# Patient Record
Sex: FEMALE | Race: OTHER | Employment: OTHER | URBAN - METROPOLITAN AREA
[De-identification: names, ages, dates, MRNs, and addresses within clinical notes are randomized per-mention and may not be internally consistent; named-entity substitution may affect disease eponyms.]

---

## 2019-08-27 ENCOUNTER — OFFICE VISIT (OUTPATIENT)
Dept: FAMILY MEDICINE CLINIC | Age: 64
End: 2019-08-27

## 2019-08-27 VITALS
RESPIRATION RATE: 16 BRPM | SYSTOLIC BLOOD PRESSURE: 110 MMHG | BODY MASS INDEX: 24.81 KG/M2 | TEMPERATURE: 97.1 F | DIASTOLIC BLOOD PRESSURE: 68 MMHG | WEIGHT: 115 LBS | HEART RATE: 66 BPM | HEIGHT: 57 IN | OXYGEN SATURATION: 97 %

## 2019-08-27 DIAGNOSIS — B96.81 HELICOBACTER PYLORI GASTRITIS: ICD-10-CM

## 2019-08-27 DIAGNOSIS — B19.20 HEPATITIS C VIRUS INFECTION WITHOUT HEPATIC COMA, UNSPECIFIED CHRONICITY: Primary | ICD-10-CM

## 2019-08-27 DIAGNOSIS — K29.70 HELICOBACTER PYLORI GASTRITIS: ICD-10-CM

## 2019-08-27 DIAGNOSIS — K64.4 EXTERNAL HEMORRHOIDS: ICD-10-CM

## 2019-08-27 DIAGNOSIS — J31.0 RHINITIS, UNSPECIFIED TYPE: ICD-10-CM

## 2019-08-27 RX ORDER — FLUTICASONE PROPIONATE 50 MCG
2 SPRAY, SUSPENSION (ML) NASAL DAILY
Qty: 1 BOTTLE | Refills: 12 | Status: SHIPPED | OUTPATIENT
Start: 2019-08-27 | End: 2021-03-15

## 2019-08-27 NOTE — PATIENT INSTRUCTIONS
Hepatitis C: Instrucciones de cuidado - [ Hepatitis C: Care Instructions ]  Instrucciones de cuidado    La hepatitis C es betty infección del hígado causada por un virus. Nicholas se contagia cuando la suze o los líquidos corporales de betty persona infectada entran en el organismo de Ramesh. Ursa ocurre con mayor frecuencia cuando se comparten agujas contaminadas con el virus. En el pasado, la gente se contagiaba con nicholas virus por transfusiones de Natan y trasplantes de [place]. Shun desde 1992, toda la suze y los órganos donados se analizan para determinar si tienen hepatitis C. Así que contagiarse con nicholas virus de esta manera es algo muy raro. Con menos frecuencia, la hepatitis C se puede contagiar a través de [employer] sexuales y por compartir artículos elizabet cuchillas de afeitar o cepillos de dientes. Las agujas para tatuajes y los \"piercings\" corporales también pueden transmitirel virus. El virus no siempre causa síntomas. Shun usted podría sentirse cansado. Y podría tener dolor de winter, dolor de músculos, náuseas y dolor en la parte superior derecha del abdomen. Otros síntomas incluyen piel amarillenta y [place]. El tratamiento en el hogar puede ayudarle a [employer] síntomas. Y rich médico podría recetarle medicamentos antivirales. La infección a ericka plazo puede conducir a daños graves en el hígado. Por lo tanto, asegúrese de acudir a todas las citas de Yvette Chowdhury. La atención de seguimiento es betty parte clave de rich tratamiento y seguridad. Asegúrese de hacer y acudir a todas las citas, y llame a rich médico si está teniendo problemas. También es betty buena idea saber los resultados de rayray exámenes y mantener betty lista de los medicamentos que negrita. ¿Cómo puede cuidarse en el hogar? · Sea trinidad con los medicamentos. Si rich médico le receta un medicamento antiviral, tómelo exactamente según las indicaciones. Llame a rich médico si carolyn estar teniendo problemas con rich medicamento.   · No sonja alcohol. El alcohol puede dañar el hígado. Avísele a rich médico si necesita ayuda para dejarlo. La asesoría psicológica, los grupos de apoyo y a veces algunos medicamentos pueden ayudarle a mantenerse sobrio. · No consuma drogas ilegales y no use medicamentos herbarios. Pueden empeorar los problemas del hígado. · Asegúrese de que rich médico sepa todos los medicamentos que negrita. Algunos medicamentos, elizabet el acetaminofén (Tylenol), pueden Boeing problemas del hígado. No tome ningún medicamento nuevo a menos que rich médico se lo indique. [place] incluye medicamentos de [employer] First American. · Mantenga un estilo de rojelio saludable. Whitney abundante ejercicio si se siente capaz de hacerlo. Siga betty dieta saludable. · Raquel abundantes líquidos, los suficientes para que rich orina sea de color amarillo suki o transparente elizabet el agua. Si tiene [employer], el corazón o el hígado y tiene que Tierra's líquidos, hable con rich médico antes de aumentar rich consumo. · Si no lo ha hecho todavía, vacúnese para protegerse de la hepatitis A y B, la gripe y el neumococo.  · La infección puede causarle comezón. Westland Snuffer y evite el sol. Trate de usar ropa de algodón. Hable con rich médico acerca de medicamentos de venta maverick para el picor. Estos incluyen difenhidramina (Benadryl) y clorfeniramina (Chlor-[place]). Siga las instrucciones de la etiqueta. · Si se siente deprimido, hable con rich médico acerca del tratamiento. Muchas personas se deprimen cuando tienen enfermedades crónicas. Tenga en cuenta que un medicamento antiviral puede empeorar la depresión. Para evitar contagiar la hepatitis C a otras personas  · Avísele a las personas con las que vive o tiene relaciones sexuales acerca de rich enfermedad lo antes que pueda. · No comparta agujas para inyectarse drogas. No comparta otros objetos (elizabet algodón, cucharas y [place]) con otros.  Averigüe si hay un programa de intercambio de agujas en rich localidad y utilícelo. Inscríbase en un programa para el tratamiento de la drogadicción. · Tenga relaciones sexuales más seguras. Reduzca la cantidad de parejas sexuales si tiene más de [place]. A menos que esté en betty relación de larga duración en la que ninguno de los dos tenga relaciones sexuales con Emmie, use siempre condones de látex cuando tenga relaciones sexuales. · No done suze ni productos de [place], [place], semen ni óvulos. · Asegúrese de que todo el equipo esté esterilizado si se va a hacer un tatuaje, un \"piercing\" corporal o si va a recibir acupuntura. · No comparta artículos personales. Estos incluyen cuchillas de afeitar, cepillos dentales, toallas y roni de uñas. · Avise de rich enfermedad a rich médico, dentista o cualquier otra persona que pudiera estar en contacto con rich suze. · Evite que otras personas entren en contacto con rich suze y otros líquidos corporales. Mantenga cubiertos cualquier Kelly perez. · [employer] marixa a fondo con [facility] y [place], [place] elizabet cualquier objeto que haya estado en contacto con rich suze. ¿Cuándo debe pedir ayuda? Llame al 911 en cualquier momento que considere que necesita atención de [place]. Por ejemplo, llame si:    · Se desmayó (perdió el conocimiento).     · Tiene graves dificultades para respirar.     · Se siente muy confuso y no puede pensar con claridad.     · Osman Simmer o algo parecido a granos de café molido.     · Glory heces son de color rojizo o muy sanguinolentas (con suze).    Llame a rich médico ahora mismo o busque atención médica inmediata si:    · Tiene cualquier dificultad para respirar.     · Tiene nuevos moretones o manchas de suze debajo de la piel.     · Glory heces son negruzcas y parecidas al alquitrán o tienen rastros de [place].     · Tiene señales de necesitar más líquidos.  Tiene los ojos hundidos y la boca seca, y [facility] solo poca cantidad de color oscuro.    Preste especial atención a los cambios en rich kofi y asegúrese de comunicarse con rich médico si:    · Le sangra la Uriel Brody.     · Le sangran las encías cuando se cepilla los dientes. ¿Dónde puede encontrar más información en inglés? Eugene Lee a http://ramya-nenita.info/. Eryn Riggs [id number] en la búsqueda para aprender más acerca de \"Hepatitis C: Instrucciones de cuidado - [ Hepatitis C: Care Instructions ]. \"  Revisado: 30 julio, 2018  Versión del contenido: 12.1  © 4752-8296 Healthwise, Incorporated. Las instrucciones de cuidado fueron adaptadas bajo licencia por [employer] Connections (which disclaims liability or warranty for this information). Si usted tiene [place] afección médica o sobre estas instrucciones, siempre pregunte a rich profesional de kofi. Healthwise, Incorporated niega toda garantía o responsabilidad por rich uso de esta información. Aprenda sobre la hepatitis C - [ Learning About Hepatitis C ]  ¿Qué es la hepatitis C? La hepatitis C es betty infección del hígado. Es causada por el virus de la hepatitis C. El virus se transmite a través de la suze y de los líquidos corporales infectados. La hepatitis C a menudo se transmite cuando betty persona comparte agujas infectadas utilizadas para inyectarse drogas ilegales. También se puede transmitir cuando betty persona utiliza betty aguja con suze infectada. [place] podría ocurrir al [employer] un tatuaje o \"piercing\". O podría suceder al ponerse betty inyección en algunos países en vías de desarrollo en donde se utilizan agujas más de betty vez para qasim inyecciones. En casos raros, betty madre con hepatitis C puede contagiar el virus a rich bebé en el momento del nacimiento. O un trabajador de atención médica puede exponerse accidentalmente a suze infectada con hepatitis C. Hay un riesgo muy pequeño de contraer el virus por contacto sexual. El riesgo es mayor si rich bonny sexual también tiene VIH u otra infección de transmisión sexual, o si tiene usted muchas parejas sexuales.   Usted no puede infectarse de la hepatitis C debido a un contacto casual. [place] es un contacto elizabet abrazarse, besarse, estornudar, toser y compartir alimentos o bebidas. ¿Qué sucede cuando usted tiene hepatitis C? Algunas personas que contraen hepatitis C la tienen kristal un tiempo corto y Marks Amen. [place] se llama hepatitis C aguda. Shun la mayoría de las personas tienen hepatitis C crónica. [place] puede conducir a daño hepático, así elizabet a cirrosis, cáncer de hígado e insuficiencia hepática. Los expertos recomiendan que determinados grupos de personas se zia la prueba para el virus. [place] incluye a personas que tienen señales de enfermedad del hígado o que alguna vez pierce compartido agujas al usar drogas ilegales. Pregúntele a rich médico si hacerse la prueba es adecuado para usted. También puede comprar betty prueba para hacerse en casa llamada \"Home Access Hepatitis C Check kit\" disponible en la mayoría de las farmacias. Si la prueba indica que usted ha estado expuesto al virus en el pasado, asegúrese de hablar con rich médico para averiguar si tiene el virus actualmente. ¿Cuáles son los síntomas? [address] con hepatitis C no tienen síntomas al principio. Los síntomas podrían incluir:  · Cansancio. · Dolor de winter. · Músculos adoloridos. · Náuseas. · Dolor en la parte superior derecha del abdomen. · Coloración amarillenta de la piel y de los ojos (ictericia). · Lurdes Hoover. ¿Cómo se puede prevenir la hepatitis C? No hay vacuna para prevenir la enfermedad. Cualquier persona que tenga hepatitis C puede transmitir el virus a otra persona. Usted puede william medidas para reducir las probabilidades de infección. · No comparta agujas para inyectarse drogas. · [address] en un entorno de atención de [place]. Use guantes y ropa de protección. Deseche adecuadamente agujas y otros objetos afilados.   · Asegúrese de que todos los instrumentos y los suministros estén esterilizados si se va a hacer un tatuaje, un \"piercing\" o si le van a hacer acupuntura. Para evitar contagiar la hepatitis C, si la tiene:  · No comparta agujas ni otros enseres, elizabet algodón, cucharas y [place], si [place] agujas para inyectarse drogas. · Mantenga cubiertos los arellano, los raspones y las [place]. [place] prevendrá que otras personas entren en contacto con rich suze y otros líquidos corporales. Deseche cualquier artículo empapado de suze, elizabet, por ejemplo, vendas usadas. · No done suze ni semen. · Vince y todo lo que haya estado en contacto con rich suze. Use jabón y [place]. · No comparta rich cepillo de dientes, navajas de afeitar, cortaúñas ni nada que pueda [employer] Inc. · Use condones de látex al [employer] sexuales si tiene VIH, más de [facility] bonny sexual o betty infección de transmisión sexual.  ¿Cómo se trata la hepatitis C?  · Si tiene hepatitis C aguda, rich médico probablemente le recete un medicamento. · Si tiene hepatitis C crónica, el tratamiento depende de si tiene daño hepático, otros problemas de kofi y la cantidad que tenga de virus en rich organismo y de qué tipo sea. · Usted tendrá que yennifer a rich médico con regularidad para [employer] de suze a fin de revisarse el hígado. La atención de seguimiento es betty parte clave de rich tratamiento y seguridad. Asegúrese de hacer y acudir a todas las citas, y llame a rich médico si está teniendo problemas. También es betty buena idea saber los resultados de rayray exámenes y mantener betty lista de los medicamentos que negrita. ¿Dónde puede encontrar más información en inglés? Jeremy Barajas a http://ramya-nenita.info/. Escriba C666 en la búsqueda para aprender más acerca de \"Aprenda sobre la hepatitis C - [ Learning About Hepatitis C ]. \"  Revisado: 30 julio, 2018  Versión del contenido: 12.1  © 0234-3805 Healthwise, Incorporated.  Las instrucciones de cuidado fueron adaptadas bajo licencia por Good Help Connections (which disclaims liability or warranty for this information). Si usted tiene [place] afección médica o sobre estas instrucciones, siempre pregunte a rich profesional de kofi. [facility], Incorporated niega toda garantía o responsabilidad por rich uso de esta información. Hemorroides: Instrucciones de cuidado - [ Hemorrhoids: Care Instructions ]  Instrucciones de [address] hemorroides son Deitra Pick agrandadas que se desarrollan en el canal del ano. Los síntomas más comunes son sangrado kristal las evacuaciones del intestino, comezón, hinchazón y dolor rectal. A veces pueden ser incómodas, miguel ángel las hemorroides carol vez son un problema grave. [address] hemorroides se pueden tratar con cambios sencillos en rich Marrianne Brice y rayray hábitos intestinales. Entre estos cambios se encuentran consumir más [place] y no esforzarse (pujar) para eliminar las heces (defecar). [address] hemorroides no requieren de [place] u otro tratamiento a menos que teetee muy grandes y dolorosas o sangren mucho. La atención de seguimiento es betty parte clave de rich tratamiento y seguridad. Asegúrese de hacer y acudir a todas las citas, y llame a rich médico si está teniendo problemas. También es betty buena idea saber los resultados de rayray exámenes y mantener betty lista de los medicamentos que negrita. ¿Cómo puede cuidarse en el hogar? · Siéntese en unas pocas pulgadas de agua tibia (baño de asiento) 3 veces al día y después de evacuar el intestino. El agua tibia ayuda con el dolor y la comezón. · Colóquese hielo en la gwen anal varias veces al día kristal 10 minutos cada vez. Póngase un paño wylie entre el hielo y la piel. Enseguida póngase betty toalla húmeda tibia en la gwen kristal otros 10 a 20 minutos. · [employer] analgésicos (medicamentos para el dolor) exactamente según las indicaciones. ? Si el médico le recetó un analgésico, tómelo según las indicaciones.   ? Si no está tomando un analgésico recetado, pregúntele a rich médico si puede william cata de [employer] First American. · Mantenga limpia la gwen del ano, miguel ángel límpiese con suavidad. Utilice agua y [address] [facility], [facility], o use toallitas para bebé o R Vitaly [place] 1 medicinales, elizabet las de [place]. · Utilice ropa interior de algodón y ropa holgada para reducir la humedad en la gwen del ano. · [employer]. Rebecca Cardozo elizabet panes y cereales integrales, vegetales crudos, frutas crudas y secas, y frijoles (habichuelas). · Raquel abundantes líquidos, suficientes para que rich orina sea de color amarillo suki o transparente elizabet el agua. Si tiene [place] [facility] Financial, el corazón o el hígado y tiene que Tierra's líquidos, hable con rich médico antes de aumentar rich consumo. · Utilice un ablandador de heces que contenga salvado o psilio. Puede ahorrar dinero comprando salvado o psilio (disponible a granel en la mayoría de las tiendas naturistas) y [employer] comidas o mezclándolo con jugo de fruta. O puede utilizar productos elizabet Metamucil o Hydrocil. · Practique hábitos saludables de evacuación. ? Vaya al baño tan pronto elizabet tenga ganas. ? Evite esforzarse al evacuar. Relájese y dese tiempo para dejar que las cosas ocurran de forma natural.  ? No contenga la respiración mientras evacúa. ? No trenton mientras está sentado en el inodoro. Levántese del inodoro sullivan pronto haya terminado. · Lilly International medicamentos exactamente elizabet le fueron recetados. Llame a rich médico si carolyn estar teniendo problemas con rich medicamento. ¿Cuándo debe pedir ayuda? Llame al 911 en cualquier momento que considere que necesita atención de emergencia.  Por ejemplo, llame si:    · Glory heces son de color rojizo o muy sanguinolentas (con suze).    Llame a rich médico ahora mismo o busque atención médica inmediata si:    · Siente un dolor más intenso.     · Tiene mayor sangrado.    Preste especial atención a los cambios en rich kofi y asegúrese de comunicarse con rich médico si:    · Glory síntomas no pierce michael después de 3 ó 4 días. ¿Dónde puede encontrar más información en inglés? Yazan Terrazas a http://ramya-nenita.info/. La Mendez [id number] en la búsqueda para aprender más acerca de \"Hemorroides: Instrucciones de cuidado - [ Hemorrhoids: Care Instructions ]. \"  Revisado: 7 noviembre, 2018  Versión del contenido: 12.1  © 7824-3961 Healthwise, Incorporated. Las instrucciones de cuidado fueron adaptadas bajo licencia por Good Bell[employer] Connections (which disclaims liability or warranty for this information). Si usted tiene [place] afección médica o sobre estas instrucciones, siempre pregunte a rich profesional de kofi. Healthwise, Incorporated niega toda garantía o responsabilidad por rich uso de esta información. Rinitis: Instrucciones de cuidado - [ Rhinitis: Care Instructions ]  Instrucciones de cuidado  La rinitis es betty hinchazón e irritación en la nariz. Con frecuencia, las alergias y las infecciones son la causa. Puede tener congestión o secreción nasal. Otros síntomas son la comezón y la irritación de ojos, oídos, garganta y boca. Si las alergias son la causa, es posible que el médico le valentino pruebas para averiguar a qué es alérgico. Tariq vez pueda detener los síntomas si kofi las cosas que los causan. El médico puede sugerir o recetar medicamentos para [employer]. La atención de seguimiento es betty parte clave de rich tratamiento y seguridad. Asegúrese de hacer y acudir a todas las citas, y llame a rich médico si está teniendo problemas. También es betty buena idea saber los resultados de rayray exámenes y mantener betty lista de los medicamentos que negrita. ¿Cómo puede cuidarse en el hogar? · Si rich rinitis es causada por alergias, trate de averiguar qué es lo que [employer]. [place] pasos para evitar los desencadenantes. ? Evite los trabajos [facility]. Estos pueden remover el polen y el moho. ? No fume ni permita que otros fumen cerca de usted.  Si necesita ayuda para dejar de fumar, hable con rich médico sobre programas y medicamentos para dejar de fumar. Estos pueden aumentar rayray probabilidades de dejar el hábito para siempre. ? No use aerosoles, productos de limpieza ni perfumes. ? [address] [facility] [address] de rich casa y automóvil kristal la época de floración si el polen es cata de los desencadenantes. ? Limpie rich casa con frecuencia para controlar el polvo. ? Mantenga las [place] fuera de rich casa. · Si rich médico le recomienda un medicamento de venta maverick para [employer] síntomas, tómelo exactamente elizabet le fue recetado. Llame a rich médico si carolyn estar teniendo problemas con rich medicamento. · Use lavados nasales con solución salina (agua salada) para ayudar a mantener las fosas nasales despejadas y eliminar la mucosidad y las bacterias. Puede comprar gotas nasales de solución salina en un supermercado o betty farmacia. O puede prepararlas usted mismo en casa agregándole 1 cucharadita de sal y 1 cucharadita de bicarbonato de sodio a 2 tazas de agua destilada. Si las prepara usted mismo, llene betty jack de goma con la solución, introduzca la punta en la fosa nasal y apriete con suavidad. Suénese la nariz. ¿Cuándo debe pedir ayuda? Llame a rich médico ahora mismo o busque atención médica inmediata si:    · Tiene problemas para respirar.    Preste especial atención a los cambios en rich kofi y asegúrese de comunicarse con rich médico si:    · La mucosidad de la nariz se vuelve más espesa (elizabet pus) o contiene suze.     · Tiene síntomas nuevos o que empeoran.     · No mejora elizabet se esperaba. ¿Dónde puede encontrar más información en inglés? Sujata Alexander a http://ramya-nenita.info/. Jimmy Thao C491 en la búsqueda para aprender más acerca de \"Rinitis: Instrucciones de cuidado - [ Rhinitis: Care Instructions ]. \"  Revisado: 21 octubre, 2018  Versión del contenido: 12.1  © 3756-1332 Healthwise, Incorporated.  Las instrucciones de cuidado fueron adaptadas bajo licencia por [employer] (which disclaims liability or warranty for this information). Si usted tiene [place] afección médica o sobre estas instrucciones, siempre pregunte a rich profesional de kofi. [facility], Incorporated niega toda garantía o responsabilidad por rich uso de esta información. Uso de un aerosol nasal esteroideo: Instrucciones de cuidado - [ Using a Nasal Steroid Spray: Care Instructions ]  Instrucciones de cuidado    Rich médico puede sugerirle que use un aerosol nasal con corticosteroides para los síntomas de [place] o para problemas de los senos paranasales. Estos aerosoles reducen la hinchazón en el interior de la nariz y de los senos paranasales. A diferencia de los descongestionantes nasales en aerosol, los aerosoles esteroideos no inducen a betty mayor hinchazón cuando se danyelle de usarlos. Estos aerosoles [place] a funcionar en unos pocos días, miguel ángel pueden pasar varias semanas antes de que obtenga el efecto completo. Virginie Radha de los efectos secundarios son menores. El problema más común es betty sensación de ardor en la nariz zaira después de vee utilizado el aerosol. Algunas personas tienen hemorragias (sangrados) nasales. La atención de seguimiento es betty parte clave de rich tratamiento y seguridad. Asegúrese de hacer y acudir a todas las citas, y llame a rich médico si está teniendo problemas. También es betty buena idea saber los resultados de rayray exámenes y mantener betty lista de los medicamentos que negrita. ¿Cómo puede cuidarse en el hogar? Estos son algunos consejos para el uso de estos aerosoles:  · Es posible que necesite preparar el atomizador antes de usarlo. [place] significa rociarlo en el aire un par de veces para asegurarse de que obtenga la cantidad correcta de [place]. Siga las instrucciones de la etiqueta. · Suénese la nariz antes de aplicárselo. [place] ayudará a limpiar las fosas nasales.   · Aspire suavemente el medicamento por la [employer]power [employer] lo aplica. No aspire con fuerza, o el medicamento se irá hacia la garganta en donde no le servirá de [place]. · Apunte la boquilla directamente hacia la pared externa de la fosa nasal. [place] ayudará a evitar que el medicamento irrite las rush internas de la nariz, en especial el tabique (la pared que separa rayray fosas nasales derecha e izquierda). · No se suene la nariz kristal 10 minutos o más después de habérselo aplicado. Y trate de no estornudar. · Sea trinidad con los medicamentos. Utilice nicholas medicamento exactamente [employer]	[place] fue recetado. Llame a rich médico si carolyn estar teniendo un problema con rich medicamento. · Limpie el atomizador betty vez a la semana. [place] la etiqueta para saber cómo limpiarlo. ¿Cuándo debe pedir ayuda? Preste especial atención a los cambios en rich kofi y asegúrese de comunicarse con rich médico si tiene algún problema. ¿Dónde puede encontrar más información en inglés? Judi Avery a http://ramay-nenita.info/. Madison Michelto X755 en la búsqueda para aprender más acerca de \"Uso de un aerosol nasal esteroideo: Instrucciones de cuidado - [ Using a Nasal Steroid Spray: Care Instructions ]. \"  Revisado: 21 octubre, 2018  Versión del contenido: 12.1  © 6605-7584 Healthwise, Incorporated. Las instrucciones de cuidado fueron adaptadas bajo licencia por Good Help Connections (which disclaims liability or warranty for this information). Si usted tiene [place] afección médica o sobre estas instrucciones, siempre pregunte a rich profesional de kofi. Healthwise, Incorporated niega toda garantía o responsabilidad por rich uso de esta información.

## 2019-08-27 NOTE — PROGRESS NOTES
[facility]    CC: EOC    HPI:     Hepatitis C:  -Was found in [id number]  -Never received treatment due to cost  -Denies any associated symptoms      External Hemorrhoids:  -Has been an issue for ~10 years  -They are associated with itching and bleeding  -Last flare was 6 months ago      H. Pylori Gastritis:  -Diagnosed in 2016 or 2017  -Completed antibiotic and famotidine regimen that was prescribed at that time   -Does not know if she needed to be rechecked for issue  -Denies any symptoms      ROS: Positive items marked in RED  CON: fever, chills  Cardiovascular: palpitations, CP  Resp: SOB, cough  GI: nausea, vomiting, diarrhea  : dysuria, hematuria      Past Medical History:   Diagnosis Date    External hemorrhoids     Helicobacter pylori gastritis     Hepatitis C infection        Past Surgical History:   Procedure Laterality Date    HX BACK SURGERY  07/2006    Lumbar disc    HX HEENT  1987    Widened nasal passages    HX REFRACTIVE SURGERY Bilateral 2000       Family History   Problem Relation Age of Onset    Pancreatic Cancer Father        Social History     Socioeconomic History    Marital status:      Spouse name: Not on file    Number of children: Not on file    Years of education: Not on file    Highest education level: Not on file   Tobacco Use    Smoking status: Never Smoker    Smokeless tobacco: Never Used   Substance and Sexual Activity    Alcohol use: Not Currently    Drug use: Never       No Known Allergies    No current outpatient medications on file.     Physical Exam:      /68 (BP 1 Location: Right arm, BP Patient Position: Sitting)   Pulse 66   Temp 97.1 °F (36.2 °C) (Oral)   Resp 16   Ht 4' 9\" (1.448 m)   Wt 115 lb (52.2 kg)   SpO2 97%   BMI 24.89 kg/m²     General:  WD, WN, NAD, conversant  Eyes: sclera clear bilaterally, no discharge noted, eyelids normal in appearance  HENT: NCAT, nasal turbinates enlarged bilaterally, oropharynx clear, MMM  Lungs: CTAB, normal respiratory effort and rate  CV: RRR, no MRGs  ABD: soft, non-tender, non-distended, normal bowel sounds  Ext: no peripheral edema or digital cyanosis noted  Skin: normal temperature, turgor, color, and texture  Psych: alert and oriented to person, place and situation, normal affect  Neuro: speech normal, moving all extremities, gait normal    Assessment/Plan     Hepatitis C Infection:  -Unclear whether infection persists  -CMP, CBC, hepatitis C AB and HCV RNA by PCR w/refl genotype ordered  -Handouts given on hepatitis C and hepatitis C care  -Follow-up in 1 month      Rhinitis:  -Suspect allergic etiology  -Started on Flonase regimen  -Handouts given on rhinitis care and nasal steroid spray  -Handouts given on rhinitis care and nasal steroid spray  -Follow-up in 1 month      External Hemorrhoids:  -Currently asymptomatic  -No indication for further intervention  -Handout given on hemorrhoid care  -Follow-up in 1 month      H. pylori Gastritis:  -Likely resolved as patient is asymptomatic  -Patient advised that follow-up testing was not needed as she has no symptoms and had completed her prescribed treatment regimen  -Follow-up in 1 month        Ravi Richmond MD  8/27/2019, 10:32 AM

## 2019-08-31 LAB
ABSOLUTE BANDS, 67058: NORMAL
ABSOLUTE BLASTS: NORMAL
ABSOLUTE METAMYELOCYTES, 900360: NORMAL
ABSOLUTE MYELOCYTES: NORMAL
ABSOLUTE NRBC,ANRBC: NORMAL
ABSOLUTE PROMYELOCYTES: NORMAL
ALB/GLOBRATIO, 58C: 1.5 (CALC) (ref 1–2.5)
ALBUMIN SERPL-MCNC: 4.7 G/DL (ref 3.6–5.1)
ALP SERPL-CCNC: 48 U/L (ref 33–130)
ALT SERPL-CCNC: 25 U/L (ref 6–29)
AST SERPL W P-5'-P-CCNC: 20 U/L (ref 10–35)
BANDS,BANDS: NORMAL
BASOPHILS # BLD: 51 CELLS/UL (ref 0–200)
BASOPHILS NFR BLD: 0.9 %
BILIRUB SERPL-MCNC: 0.8 MG/DL (ref 0.2–1.2)
BLASTS,BLAST: NORMAL
BUN SERPL-MCNC: 22 MG/DL (ref 7–25)
BUN/CREATININE RATIO,BUCR: NORMAL (CALC) (ref 6–22)
CALCIUM SERPL-MCNC: 9.5 MG/DL (ref 8.6–10.4)
CHLORIDE SERPL-SCNC: 102 MMOL/L (ref 98–110)
CO2 SERPL-SCNC: 28 MMOL/L (ref 20–32)
COMMENT(S): NORMAL
CREAT SERPL-MCNC: 0.76 MG/DL (ref 0.5–0.99)
EOSINOPHIL # BLD: 103 CELLS/UL (ref 15–500)
EOSINOPHIL NFR BLD: 1.8 %
ERYTHROCYTE [DISTWIDTH] IN BLOOD BY AUTOMATED COUNT: 13.7 % (ref 11–15)
GLOBULIN,GLOB: 3.2 G/DL (CALC) (ref 1.9–3.7)
GLUCOSE SERPL-MCNC: 89 MG/DL (ref 65–99)
HCT VFR BLD AUTO: 40.5 % (ref 35–45)
HCV RNA, PCR, QT: ABNORMAL IU/ML
HEPATITIS C AB,HCAB: REACTIVE
HGB BLD-MCNC: 13.3 G/DL (ref 11.7–15.5)
LYMPHOCYTES # BLD: 901 CELLS/UL (ref 850–3900)
LYMPHOCYTES NFR BLD: 15.8 %
Lab: 6.04 LOG IU/ML
Lab: NEGATIVE
MCH RBC QN AUTO: 29.2 PG (ref 27–33)
MCHC RBC AUTO-ENTMCNC: 32.8 G/DL (ref 32–36)
MCV RBC AUTO: 88.8 FL (ref 80–100)
METAMYELOCYTES,METAS: NORMAL
MONOCYTES # BLD: 353 CELLS/UL (ref 200–950)
MONOCYTES NFR BLD: 6.2 %
MYELOCYTES,MYELO: NORMAL
NEUTROPHILS # BLD AUTO: 4292 CELLS/UL (ref 1500–7800)
NEUTROPHILS # BLD: 75.3 %
NRBC: NORMAL
PLATELET # BLD AUTO: 190 THOUSAND/UL (ref 140–400)
PMV BLD AUTO: 10.9 FL (ref 7.5–12.5)
POTASSIUM SERPL-SCNC: 4 MMOL/L (ref 3.5–5.3)
PROMYELOCYTES,PRO: NORMAL
PROT SERPL-MCNC: 7.9 G/DL (ref 6.1–8.1)
RBC # BLD AUTO: 4.56 MILLION/UL (ref 3.8–5.1)
REACTIVE LYMPHS: NORMAL
SIGNAL TO CUTOFF (HEP C), 619802: 35.8
SODIUM SERPL-SCNC: 136 MMOL/L (ref 135–146)
WBC # BLD AUTO: 5.7 THOUSAND/UL (ref 3.8–10.8)

## 2019-09-27 ENCOUNTER — OFFICE VISIT (OUTPATIENT)
Dept: FAMILY MEDICINE CLINIC | Age: 64
End: 2019-09-27

## 2019-09-27 VITALS
HEART RATE: 76 BPM | OXYGEN SATURATION: 100 % | DIASTOLIC BLOOD PRESSURE: 56 MMHG | RESPIRATION RATE: 12 BRPM | HEIGHT: 57 IN | SYSTOLIC BLOOD PRESSURE: 106 MMHG | WEIGHT: 116 LBS | BODY MASS INDEX: 25.03 KG/M2 | TEMPERATURE: 98.3 F

## 2019-09-27 DIAGNOSIS — Z23 ENCOUNTER FOR IMMUNIZATION: ICD-10-CM

## 2019-09-27 DIAGNOSIS — B18.2 CHRONIC HEPATITIS C WITHOUT HEPATIC COMA (HCC): Primary | ICD-10-CM

## 2019-09-27 DIAGNOSIS — J31.0 RHINITIS, UNSPECIFIED TYPE: ICD-10-CM

## 2019-09-27 RX ORDER — MINERAL OIL
180 ENEMA (ML) RECTAL DAILY
Qty: 90 TAB | Refills: 1 | Status: SHIPPED | OUTPATIENT
Start: 2019-09-27 | End: 2019-11-08 | Stop reason: ALTCHOICE

## 2019-09-27 NOTE — PATIENT INSTRUCTIONS
Hepatitis C: Instrucciones de cuidado - [ Hepatitis C: Care Instructions ]  Instrucciones de cuidado    La hepatitis C es betty infección del hígado causada por un virus. Nicholas se contagia cuando la suze o los líquidos corporales de betty persona infectada entran en el organismo de Leah Marques. Alger ocurre con mayor frecuencia cuando se comparten agujas contaminadas con el virus. En el pasado, la gente se contagiaba con nicholas virus por [facility] y trasplantes de [place]. Shun desde 1992, toda la suze y los órganos donados se analizan para determinar si tienen hepatitis C. Así que contagiarse con nicholas virus de esta manera es algo muy raro. Con menos frecuencia, la hepatitis C se puede contagiar a través de [employer] sexuales y por compartir artículos elizabet cuchillas de afeitar o cepillos de dientes. Las agujas para tatuajes y los \"piercings\" corporales también pueden transmitirel virus. El virus no siempre causa síntomas. Shun usted podría sentirse cansado. Y podría tener dolor de winter, dolor de músculos, náuseas y dolor en la parte superior derecha del abdomen. Otros síntomas incluyen piel amarillenta y [place]. El tratamiento en el hogar puede ayudarle a [employer] síntomas. Y rich médico podría recetarle medicamentos antivirales. La infección a ericka plazo puede conducir a daños graves en el hígado. Por lo tanto, asegúrese de acudir a todas las citas de Yvette Chowdhury. La atención de seguimiento es betty parte clave de rich tratamiento y seguridad. Asegúrese de hacer y acudir a todas las citas, y llame a rich médico si está teniendo problemas. También es betty buena idea saber los resultados de rayray exámenes y mantener betty lista de los medicamentos que negrita. ¿Cómo puede cuidarse en el hogar? · Sea trinidad con los medicamentos. Si rich médico le receta un medicamento antiviral, tómelo exactamente según las indicaciones. Llame a rich médico si carolyn estar teniendo problemas con rich medicamento.   · No sonja alcohol. El alcohol puede dañar el hígado. Avísele a rich médico si necesita ayuda para dejarlo. La asesoría psicológica, los grupos de apoyo y a veces algunos medicamentos pueden ayudarle a mantenerse sobrio. · No consuma drogas ilegales y no use medicamentos herbarios. Pueden empeorar los problemas del hígado. · Asegúrese de que rich médico sepa todos los medicamentos que negrita. Algunos medicamentos, elizabet el acetaminofén (Tylenol), pueden [employer] problemas del hígado. No tome ningún medicamento nuevo a menos que rich médico se lo indique. Alligator incluye medicamentos de [employer]. · Mantenga un estilo de rojelio saludable. Whitney abundante ejercicio si se siente capaz de hacerlo. Siga betty dieta saludable. · Raquel abundantes líquidos, los suficientes para que rich orina sea de color amarillo suki o transparente elizabet el agua. Si tiene [employer], el corazón o el hígado y tiene que [place]'s líquidos, hable con rich médico antes de aumentar rich consumo. · Si no lo ha hecho todavía, vacúnese para protegerse de la hepatitis A y B, la gripe y el neumococo.  · La infección puede causarle comezón. Barrientos Si y evite el sol. Trate de usar ropa de algodón. Hable con rich médico acerca de medicamentos de venta maverick para el picor. Estos incluyen difenhidramina (Benadryl) y clorfeniramina (Chlor-[place]eton). Siga las instrucciones de la etiqueta. · Si se siente deprimido, hable con rich médico acerca del tratamiento. Muchas personas se deprimen cuando tienen enfermedades crónicas. Tenga en cuenta que un medicamento antiviral puede empeorar la depresión. Para evitar contagiar la hepatitis C a otras personas  · Avísele a las personas con las que vive o tiene relaciones sexuales acerca de rich enfermedad lo antes que pueda. · No comparta agujas para inyectarse drogas. No comparta otros objetos (elizabet algodón, cucharas y [place]) con otros.  Averigüe si hay un programa de intercambio de agujas en rich localidad y utilícelo. Inscríbase en un programa para el tratamiento de la drogadicción. · Tenga relaciones sexuales más seguras. Reduzca la cantidad de parejas sexuales si tiene más de [place]. A menos que esté en betty relación de larga duración en la que ninguno de los dos tenga relaciones sexuales con Emmie, use siempre condones de látex cuando tenga relaciones sexuales. · No done suze ni productos de [place], [place], semen ni óvulos. · Asegúrese de que todo el equipo esté esterilizado si se va a hacer un tatuaje, un \"piercing\" corporal o si va a recibir acupuntura. · No comparta artículos personales. Estos incluyen cuchillas de afeitar, cepillos dentales, toallas y roni de uñas. · Avise de rich enfermedad a rich médico, dentista o cualquier otra persona que pudiera estar en contacto con rich suze. · Evite que otras personas entren en contacto con rich suze y otros líquidos corporales. Mantenga cubiertos cualquier Tye perezt Cava. · [employer] marixa a fondo con agua y [place], [place] elizabet cualquier objeto que haya estado en contacto con rcih suze. ¿Cuándo debe pedir ayuda? Llame al 911 en cualquier momento que considere que necesita atención de [place]. Por ejemplo, llame si:    · Se desmayó (perdió el conocimiento).     · Tiene graves dificultades para respirar.     · Se siente muy confuso y no puede pensar con claridad.     · Sharolyn Kavon o algo parecido a granos de café molido.     · Glory heces son de color rojizo o muy sanguinolentas (con suze).    Llame a rich médico ahora mismo o busque atención médica inmediata si:    · Tiene cualquier dificultad para respirar.     · Tiene nuevos moretones o manchas de suze debajo de la piel.     · Glory heces son negruzcas y parecidas al alquitrán o tienen rastros de Natan.     · Tiene señales de necesitar más líquidos.  Tiene los ojos hundidos y la boca seca, y [facility] solo poca cantidad de color oscuro.    Preste especial atención a los cambios en rich kofi y asegúrese de comunicarse con rich médico si:    · Le sangra la [place].     · Le sangran las encías cuando se cepilla los dientes. ¿Dónde puede encontrar más información en inglés? Shakira Ask a http://ramya-nenita.info/. Nancy Danes O168 en la búsqueda para aprender más acerca de \"Hepatitis C: Instrucciones de cuidado - [ Hepatitis C: Care Instructions ]. \"  Revisado: 9 junio, 2019  Versión del contenido: 12.2  © 9401-7722 [employer], [employer]. Las instrucciones de cuidado fueron adaptadas bajo licencia por [employer] (which disclaims liability or warranty for this information). Si usted tiene [place] afección médica o sobre estas instrucciones, siempre pregunte a rich profesional de kofi. [employer], [employer] niega toda garantía o responsabilidad por rich uso de esta información.

## 2019-09-27 NOTE — PROGRESS NOTES
1. Have you been to the ER, urgent care clinic since your last visit? Hospitalized since your last visit? No    2. Have you seen or consulted any other health care providers outside of the [address] [place] [address] since your last visit? Include any pap smears or colon screening.  No

## 2019-10-11 NOTE — PROGRESS NOTES
[facility]    CC: F/U of Hepatitis C and Rhinitis    HPI:     Hepatitis C:  -Got requested lab work  -Continues to deny any associated symptoms      Rhinitis:  -Taking Flonase as prescribed  -Reports symptoms are inadequately controlled      ROS: Positive items marked in RED  CON: fever, chills  Cardiovascular: palpitations, CP  Resp: SOB, cough  GI: nausea, vomiting, diarrhea  : dysuria, hematuria      Past Medical History:   Diagnosis Date    External hemorrhoids     Helicobacter pylori gastritis     Hepatitis C infection        Past Surgical History:   Procedure Laterality Date    HX BACK SURGERY  07/2006    Lumbar disc    HX HEENT  1987    Widened nasal passages    HX REFRACTIVE SURGERY Bilateral 2000       Family History   Problem Relation Age of Onset    Pancreatic Cancer Father        Social History     Socioeconomic History    Marital status: UNKNOWN     Spouse name: Not on file    Number of children: Not on file    Years of education: Not on file    Highest education level: Not on file   Tobacco Use    Smoking status: Never Smoker    Smokeless tobacco: Never Used   Substance and Sexual Activity    Alcohol use: Not Currently    Drug use: Never       No Known Allergies      Current Outpatient Medications:     fexofenadine (ALLEGRA) 180 mg tablet, Take 1 Tab by mouth daily. , Disp: 90 Tab, Rfl: 1    fluticasone propionate (FLONASE) 50 mcg/actuation nasal spray, 2 Sprays by Both Nostrils route daily. , Disp: 1 Bottle, Rfl: 12    Physical Exam:      /56   Pulse 76   Temp 98.3 °F (36.8 °C) (Oral)   Resp 12   Ht 4' 9\" (1.448 m)   Wt 116 lb (52.6 kg)   SpO2 100%   BMI 25.10 kg/m²     General:  WD, WN, NAD, conversant  Eyes: sclera clear bilaterally, no discharge noted, eyelids normal in appearance  HENT: NCAT  Lungs: CTAB, normal respiratory effort and rate  CV: RRR, no MRGs  ABD: soft, non-tender, non-distended, normal bowel sounds  Skin: normal temperature, turgor, color, and texture  Psych: alert and oriented to person, place and situation, normal affect  Neuro: speech normal, moving all extremities, gait normal      Results for Clayton Garcia (MRN [id number]):   Ref. Range 8/27/2019 11:21   WBC Latest Ref Range: 3.8 - 10.8 Thousand/uL 5.7   RBC Latest Ref Range: 3.80 - 5.10 Million/uL 4.56   HGB Latest Ref Range: 11.7 - 15.5 g/dL 13.3   HCT Latest Ref Range: 35.0 - 45.0 % 40.5   MCV Latest Ref Range: 80.0 - 100.0 fL 88.8   MCH Latest Ref Range: 27.0 - 33.0 pg 29.2   MCHC Latest Ref Range: 32.0 - 36.0 g/dL 32.8   RDW Latest Ref Range: 11.0 - 15.0 % 13.7   PLATELET Latest Ref Range: 140 - 400 Thousand/uL 190   MEAN PLATELET VOLUME Latest Ref Range: 7.5 - 12.5 fL 10.9   LYMPHOCYTES Latest Units: % 15.8   MONOCYTES Latest Units: % 6.2   EOSINOPHILS Latest Units: % 1.8   BASOPHILS Latest Units: % 0.9   ABS. NEUTROPHILS Latest Ref Range: 1,500 - 7,800 cells/uL 4,292   ABS. LYMPHOCYTES Latest Ref Range: 850 - 3,900 cells/uL 901   ABS. MONOCYTES Latest Ref Range: 200 - 950 cells/uL 353   ABS. EOSINOPHILS Latest Ref Range: 15 - 500 cells/uL 103   ABS.  BASOPHILS Latest Ref Range: 0 - 200 cells/uL 51   Sodium Latest Ref Range: 135 - 146 mmol/L 136   Potassium Latest Ref Range: 3.5 - 5.3 mmol/L 4.0   Chloride Latest Ref Range: 98 - 110 mmol/L 102   CO2 Latest Ref Range: 20 - 32 mmol/L 28   Glucose Latest Ref Range: 65 - 99 mg/dL 89   BUN Latest Ref Range: 7 - 25 mg/dL 22   Creatinine Latest Ref Range: 0.50 - 0.99 mg/dL 0.76   BUN/Creatinine ratio Latest Ref Range: 6 - 22 (calc) NOT APPLICABLE   Calcium Latest Ref Range: 8.6 - 10.4 mg/dL 9.5   GFR est non-AA Latest Ref Range: > OR = 60 mL/min/1.73m2 83   GFR est AA Latest Ref Range: > OR = 60 mL/min/1.73m2 96   Bilirubin, total Latest Ref Range: 0.2 - 1.2 mg/dL 0.8   Protein, total Latest Ref Range: 6.1 - 8.1 g/dL 7.9   Albumin Latest Ref Range: 3.6 - 5.1 g/dL 4.7   Globulin Latest Ref Range: 1.9 - 3.7 g/dL (calc) 3.2   ALT (SGPT) Latest Ref Range: 6 - 29 U/L 25   AST Latest Ref Range: 10 - 35 U/L 20   Alk. phosphatase Latest Ref Range: 33 - 130 U/L 48      Ref.  Range 8/27/2019 11:21   HEPATITIS D VIRUS (HDV) IGM ANTIBODY, EIA NEGATIVE NEGATIVE       HEPATITIS C AB, RFLX TO QT BY PCR (8/27/2019):   Component Value Flag Ref Range Units Status   Hepatitis C Ab REACTIVE  Abnormal   NON-REACTIVE  Final   Signal to Cutoff (Hep C) 35.80  High   <1.00  Final       HCV RT-PCR, QUANT (8/27/2019):  Component Value Flag Ref Range Units Status   HCV RNA, PCR, QT 1,100,000  High   NOT DETECTED IU/mL Final   HCV RNA, QUANTITATIVEREAL TIME PCR 6.04  High   NOT DETECTED Log IU/mL Final       Assessment/Plan     Chronic Hepatitis C Infection:  -Counseled on diagnosis and recommended care  -Referred to GI for evaluation/treatment  -Handout given on hepatitis C care  -F/U in one month      Rhinitis, Inadequately Controlled:  -Likely allergic etiology  -Will add on Allegra to current regimen  -F/U in one month      Health Maintenance:  -Flu shot given        Tyler Ansari MD  9/27/2019

## 2019-11-08 ENCOUNTER — OFFICE VISIT (OUTPATIENT)
Dept: FAMILY MEDICINE CLINIC | Age: 64
End: 2019-11-08

## 2019-11-08 VITALS
RESPIRATION RATE: 12 BRPM | WEIGHT: 114 LBS | HEIGHT: 57 IN | TEMPERATURE: 96.8 F | BODY MASS INDEX: 24.59 KG/M2 | DIASTOLIC BLOOD PRESSURE: 52 MMHG | OXYGEN SATURATION: 98 % | SYSTOLIC BLOOD PRESSURE: 105 MMHG | HEART RATE: 72 BPM

## 2019-11-08 DIAGNOSIS — J31.0 RHINITIS, UNSPECIFIED TYPE: ICD-10-CM

## 2019-11-08 DIAGNOSIS — Z23 ENCOUNTER FOR IMMUNIZATION: ICD-10-CM

## 2019-11-08 DIAGNOSIS — B18.2 CHRONIC HEPATITIS C WITHOUT HEPATIC COMA (HCC): Primary | ICD-10-CM

## 2019-11-08 RX ORDER — VELPATASVIR AND SOFOSBUVIR 100; 400 MG/1; MG/1
1 TABLET, FILM COATED ORAL DAILY
Refills: 2 | COMMUNITY
Start: 2019-10-31 | End: 2021-03-15

## 2019-11-08 RX ORDER — AZELASTINE 1 MG/ML
2 SPRAY, METERED NASAL 2 TIMES DAILY
Qty: 1 BOTTLE | Refills: 5 | Status: SHIPPED | OUTPATIENT
Start: 2019-11-08 | End: 2021-03-15

## 2019-11-08 NOTE — PROGRESS NOTES
1. Have you been to the ER, urgent care clinic since your last visit? Hospitalized since your last visit? No    2. Have you seen or consulted any other health care providers outside of the [address] [place] [address] since your last visit? Include any pap smears or colon screening.  No

## 2019-11-08 NOTE — PATIENT INSTRUCTIONS
Análisis de suze para alergias: Acerca de estos análisis - [ Allergy Blood Tests: About These Tests ]  ¿Qué son Marney Carp análisis? En los análisis de suze para alergias se buscan sustancias en la suze (anticuerpos) que el cuerpo produce en reacción a los alérgenos. Byron Greener son aquellas cosas a las que es alérgico el organismo. Hay muchos tipos de análisis de suze para [place], [place] los siguientes:  · Ensayo inmunoabsorbente ligado a enzimas (MARIBETH, EIA). · Pruebas radioalergoabsorbentes (RAST). · Prueba de inmunoensayo de captura (ImmunoCAP, UniCAP o Pharmacia CAP). · Pruebas fluor-alergoabsorbentes (FAST). · Prueba simultánea de antígenos múltiples (MAST). ¿Por qué se [employer] análisis? Los análisis de suze para alergias se hacen para averiguar a qué cosas tiene usted alergia. ¿Qué sucede kristal estos análisis? El profesional de la kofi que le tome betty muestra de suze:  · Le colocará betty royal elástica alrededor de la parte superior del brazo. [place] hace que las venas por debajo de la royal se zia más grandes y sea más fácil insertar la aguja. · Limpiará con alcohol el lugar de inserción de la aguja. · Insertará la aguja en la vena. · Conectará un tubo a la aguja para llenarlo de [place]. · Le quitará la royal del brazo cuando haya recogido la suze necesaria. · Colocará betty almohadilla de gasa o betty bolita de algodón en el sitio de la punción al extraer la aguja. · Hará presión en el sitio y [employer] pondrá betty venda. ¿Qué más debería saber usted acerca de estos análisis? Los [employer] análisis de suze para alergias por lo general están disponibles en unos 7 días. ¿Dónde puede encontrar más información en inglés? Raquel Jones a http://ramya-nenita.info/. Escriba T573 en la búsqueda para aprender más acerca de \"Análisis de suze para alergias: Acerca de estos análisis - [ Allergy Blood Tests: About These Tests ]. \"  Revisado: 7 jadon, 2019  Versión del contenido: 12.2  © 7088-9229 [employer], [employer]. Las instrucciones de cuidado fueron adaptadas bajo licencia por Good [employer] (which disclaims liability or warranty for this information). Si usted tiene [place] afección médica o sobre estas instrucciones, siempre pregunte a rich profesional de kofi. Healthwise, Incorporated niega toda garantía o responsabilidad por rich uso de esta información.

## 2019-11-08 NOTE — PROGRESS NOTES
[facility]    CC: F/U for Hepatitis C and Rhinitis    HPI:     Chronic Hepatitis C Infection:  -Has seen GI for issue  -Started on sofosbuvir-velpatasiver regimen  -Began taking the medication yesterday  -Denies any side effects or issues with the medication      Rhinitis:  -Taking allegra  -Denies any side effects or issues with the medication  -Reports symptoms have been inadequately controlled      ROS: Positive items marked in RED  CON: fever, chills  Cardiovascular: palpitations, CP  Resp: SOB, cough  GI: nausea, vomiting, diarrhea  : dysuria, hematuria      Past Medical History:   Diagnosis Date    External hemorrhoids     Helicobacter pylori gastritis     Hepatitis C infection        Past Surgical History:   Procedure Laterality Date    HX BACK SURGERY  07/2006    Lumbar disc    HX HEENT  1987    Widened nasal passages    HX REFRACTIVE SURGERY Bilateral 2000       Family History   Problem Relation Age of Onset    Pancreatic Cancer Father        Social History     Socioeconomic History    Marital status: LEGALLY      Spouse name: Not on file    Number of children: Not on file    Years of education: Not on file    Highest education level: Not on file   Tobacco Use    Smoking status: Never Smoker    Smokeless tobacco: Never Used   Substance and Sexual Activity    Alcohol use: Not Currently    Drug use: Never       No Known Allergies      Current Outpatient Medications:     sofosbuvir-velpatasvir (EPCLUSA) 400-100 mg tablet, Take 1 Tab by mouth daily. , Disp: , Rfl: 2    fexofenadine (ALLEGRA) 180 mg tablet, Take 1 Tab by mouth daily. , Disp: 90 Tab, Rfl: 1    fluticasone propionate (FLONASE) 50 mcg/actuation nasal spray, 2 Sprays by Both Nostrils route daily. , Disp: 1 Bottle, Rfl: 12    Physical Exam:      /52   Pulse 72   Temp 96.8 °F (36 °C) (Oral)   Resp 12   Ht 4' 9\" (1.448 m)   Wt 114 lb (51.7 kg)   SpO2 98%   BMI 24.67 kg/m²     General:  WD, WN, NAD, conversant  Eyes: sclera clear bilaterally, no discharge noted, eyelids normal in appearance  HENT: NCAT, nasal turbinates enlarged bilaterally  Lungs: CTAB, normal respiratory effort and rate  CV: RRR, no MRGs  ABD: soft, non-tender, non-distended, normal bowel sounds  Skin: normal temperature, turgor, color, and texture  Psych: alert and oriented to person, place and situation, normal affect  Neuro: speech normal, moving all extremities, gait normal      Assessment/Plan     Chronic Hepatitis C Infection:  -Will defer management to GI specialist  -Need to obtain records from gastroenterology for review  -Follow-up in 1 month      Rhinitis:  -Likely due to allergic etiology  -Will add on azelastine to current medication regimen  -Allergen profile ordered  -Follow-up in 1 month        Joana Hoff MD  11/8/2019, 1:31 PM

## 2019-11-11 LAB
ALTERNARIA ALTERNATA,M6A: <0.1 KU/L
ASPERGILUS FUMATUS,M3A: <0.1 KU/L
BERMUDA GRASS,G2A: <0.1 KU/L
BIRCH,T3A: <0.1 KU/L
CAT DANDER,E1A: <0.1 KU/L
CLADOSPORIUM HERBARU,M2A: <0.1 KU/L
CLASS, 40604180: 0
CLASS, 40604340: 0
CLASS, 40607560: 0
CLASS, 40607800: 0
CLASS, 40608550: 0
CLASS, 40609050: 0
CLASS, 40609620: 0
CLASS, 40609880: 0
CLASS, 40624480: 0
CLASS, 40627860: 0
CLASS, 40683380: 0
CLASS: 0
CLASS: 0
COCKROACH,I6A: <0.1 KU/L
COMMON RAGWEED,W1A: <0.1 KU/L
D. FARINAE,D2A: <0.1 KU/L
DERMATOPHAGOIDES PTERONYSSINUS (D1), 16101: <0.1 KU/L
DOG DANDER,INH27: <0.1 KU/L
IMMUNOGLOBULIN E,IGE: 18 KU/L
INTERPRETATION, 55999999: NORMAL
JOHNSON GRASS,G10A: <0.1 KU/L
Lab: 0
Lab: <0.1 KU/L
MAPLE,T1A: <0.1 KU/L
MOUSE URINE PROTEINS (E72) IGE: <0.1 KU/L
MULBERRY,T70A: <0.1 KU/L
OAK,T7A: <0.1 KU/L
PENCILLIUM NOTATUM,M1A: <0.1 KU/L
PIGWEED,W14A: <0.1 KU/L
TIMOTHY GRASS,G6A: <0.1 KU/L

## 2019-12-12 ENCOUNTER — OFFICE VISIT (OUTPATIENT)
Dept: FAMILY MEDICINE CLINIC | Age: 64
End: 2019-12-12

## 2019-12-12 VITALS
HEART RATE: 71 BPM | HEIGHT: 57 IN | BODY MASS INDEX: 24.59 KG/M2 | SYSTOLIC BLOOD PRESSURE: 108 MMHG | RESPIRATION RATE: 14 BRPM | WEIGHT: 114 LBS | DIASTOLIC BLOOD PRESSURE: 55 MMHG | OXYGEN SATURATION: 99 % | TEMPERATURE: 96.6 F

## 2019-12-12 DIAGNOSIS — Z91.010 PEANUT ALLERGY: ICD-10-CM

## 2019-12-12 DIAGNOSIS — Z00.00 WELL WOMAN EXAM (NO GYNECOLOGICAL EXAM): ICD-10-CM

## 2019-12-12 DIAGNOSIS — Z23 ENCOUNTER FOR IMMUNIZATION: ICD-10-CM

## 2019-12-12 DIAGNOSIS — J30.0 VASOMOTOR RHINITIS: Primary | ICD-10-CM

## 2019-12-12 NOTE — PROGRESS NOTES
1. Have you been to the ER, urgent care clinic since your last visit? Hospitalized since your last visit? No    2. Have you seen or consulted any other health care providers outside of the [address] [place] [address] since your last visit? Include any pap smears or colon screening. No      After obtaining consent, and per orders of Dr. Luz Kinney, injection of Tdap and Pneumonia 23 given by Randy Aguirre LPN. Patient instructed to remain in clinic for 20 minutes afterwards, and to report any adverse reaction to me immediately.

## 2019-12-12 NOTE — PROGRESS NOTES
[facility]    CC: Follow-up of Rhinitis    HPI:     Rhinitis:  -Got requested lab work  -Taking azelastine as prescribed  -Reports improvement in symptoms with azelastine      ROS: Positive items marked in RED  CON: fever, chills  Cardiovascular: palpitations, CP  Resp: SOB, cough  GI: nausea, vomiting, diarrhea  : dysuria, hematuria      Past Medical History:   Diagnosis Date    External hemorrhoids     Helicobacter pylori gastritis     Hepatitis C infection     Peanut allergy        Past Surgical History:   Procedure Laterality Date    HX BACK SURGERY  07/2006    Lumbar disc    HX HEENT  1987    Widened nasal passages    HX REFRACTIVE SURGERY Bilateral 2000       Family History   Problem Relation Age of Onset    Pancreatic Cancer Father        Social History     Socioeconomic History    Marital status: UNKNOWN     Spouse name: Not on file    Number of children: Not on file    Years of education: Not on file    Highest education level: Not on file   Tobacco Use    Smoking status: Never Smoker    Smokeless tobacco: Never Used   Substance and Sexual Activity    Alcohol use: Not Currently    Drug use: Never       No Known Allergies      Current Outpatient Medications:     sofosbuvir-velpatasvir (EPCLUSA) 400-100 mg tablet, Take 1 Tab by mouth daily. , Disp: , Rfl: 2    azelastine (ASTELIN) 137 mcg (0.1 %) nasal spray, 2 Sprays by Both Nostrils route two (2) times a day. Use in each nostril as directed, Disp: 1 Bottle, Rfl: 5    fluticasone propionate (FLONASE) 50 mcg/actuation nasal spray, 2 Sprays by Both Nostrils route daily. , Disp: 1 Bottle, Rfl: 12    Physical Exam:      /55   Pulse 71   Temp 96.6 °F (35.9 °C) (Oral)   Resp 14   Ht 4' 9\" (1.448 m)   Wt 114 lb (51.7 kg)   SpO2 99%   BMI 24.67 kg/m²     General:  WD, WN, NAD, conversant  Eyes: sclera clear bilaterally, no discharge noted, eyelids normal in appearance  HENT: NCAT  Lungs: CTAB, normal respiratory effort and rate  CV: RRR, no MRGs  ABD: soft, non-tender, non-distended, normal bowel sounds  Skin: normal temperature, turgor, color, and texture  Psych: alert and oriented to person, place and situation, normal affect  Neuro: speech normal, moving all extremities, gait normal    Allergen Profile (11/8/2019):  Component Value Flag Ref Range Units Status   DERMATOPHAGOIDES PTERONYSSINUS (D1) <0.10    kU/L Final   CLASS 0     Final   D.  FARINAE <0.10    kU/L Final   CLASS 0     Final   PENCILLIUM NOTATUM <0.10    kU/L Final   CLASS 0     Final   CLADOSPORIUM HERBARU <0.10    kU/L Final   CLASS 0     Final   ASPERGILUS FUMATUS <0.10    kU/L Final   CLASS 0     Final   ALTERNARIA ALTERNATA <0.10    kU/L Final   CLASS 0     Final   CAT DANDER <0.10    kU/L Final   CLASS 0     Final   Dog dander <0.10    kU/L Final   CLASS 0     Final   COCKROACH <0.10    kU/L Final   CLASS 0     Final   MAPLE <0.10    kU/L Final   CLASS 0     Final   BIRCH <0.10    kU/L Final   CLASS 0     Final   MOUNTAIN CEDAR,IgE <0.10    kU/L Final   CLASS 0     Final   COTTONWOOD <0.10    kU/L Final   CLASS 0     Final   OAK <0.10    kU/L Final   CLASS 0     Final   ELM <0.10    kU/L Final   CLASS 0     Final   Pecan Tree <0.10    kU/L Final   CLASS 0     Final   MULBERRY <0.10    kU/L Final   CLASS 0     Final   [place] grass, IgE <0.10    kU/L Final   CLASS 0     Final   MORENITA GRASS <0.10    kU/L Final   CLASS 0     Final   [place] grass <0.10    kU/L Final   CLASS 0     Final   COMMON RAGWEED <0.10    kU/L Final   CLASS 0     Final   PIGWEED <0.10    kU/L Final   CLASS 0     Final   SHEEP SORREL <0.10    kU/L Final   CLASS 0     Final   MOUSE URINE PROTEINS (E72) IGE <0.10    kU/L Final   CLASS 0     Final   Immunoglobulin E (IgE) 18   <VU=509 kU/L Final       Assessment/Plan     Vasomotor Rhinitis:  -Counseled on negative allergen test results  -Will continue current Azelastine regimen  -Follow-up in 3 months for well visit      Health Maintenance:  -Fasting lipid panel ordered for planned well visit  -Pneumovax 23 and Tdap administered today        Christene Rubinstein, MD  12/12/2019, 3:28 PM

## 2019-12-12 NOTE — PATIENT INSTRUCTIONS
Pruebas de colesterol y triglicéridos: Christiano Le - [ Cholesterol and Triglycerides Tests: About These Tests ]  ¿Qué son estas pruebas? Las pruebas de colesterol y triglicéridos miden la cantidad de grasas en la suze. Estas grasas tienen tanto el colesterol \"borden\" (HDL) elizabet el \"yousif\" (LDL). ¿Por qué se hacen estas pruebas? Estas pruebas se hacen para ayudar a determinar rich riesgo de tener un ataque cardíaco y un ataque cerebral. Pueden ayudar a rich médico a determinar lo robb que está funcionando el medicamento para algunos problemas de [place]. ¿Cómo puede prepararse para estas pruebas? · Es posible que rich médico le diga que guarde ayuno antes de [employer] [place] significa que no coma ni sonja nada excepto agua de 9 a 12 horas antes de las pruebas. En la [employer], puede william glory medicamentos con agua en la mañana que se hace la prueba. · No coma alimentos con alto contenido de grasa la noche anterior a las pruebas. · No sonja alcohol ni valentino ejercicio vigoroso la noche anterior a las pruebas. · Asegúrese de informarle a rich médico acerca de todos los medicamentos recetados y de [employer], hierbas u otros suplementos que tome. Pueden afectar los resultados de [address]. ¿Qué ocurre kristal estas pruebas? Un profesional de [employer] negrita betty muestra de [place]. ¿Qué más debería saber usted acerca de estas pruebas? Glory niveles de colesterol pueden ayudar a rich médico a determinar rich riesgo de tener un ataque cardíaco o un ataque cerebral.  Shun no se trata solamente de rich colesterol. Rich médico [place] glory niveles de colesterol además de otras cosas para calcular rich riesgo. Estas incluyen:  · Rich presión arterial.  · Si tiene o no tiene diabetes. · Rich edad, sexo y [place]. · Si fuma o no fuma. Usted y rich médico pueden hablar acerca de si tiene que bajar rich riesgo y qué tratamiento es el mejor para usted. ¿Dónde puede encontrar más información en inglés?   Shakira Ask a http://ramya-nenita.info/. Suzie Kohut X526 en la búsqueda para aprender más acerca de \"Pruebas de colesterol y triglicéridos: Lico Bae - [ Cholesterol and Triglycerides Tests: About These Tests ]. \"  Revisado: 9 abril, 2019  Versión del contenido: 12.2  © 1222-0651 Healthwise, Incorporated. Las instrucciones de cuidado fueron adaptadas bajo licencia por [employer] (which disclaims liability or warranty for this information). Si usted tiene [place] afección médica o sobre estas instrucciones, siempre pregunte a rich profesional de kofi. Healthwise, Incorporated niega toda garantía o responsabilidad por rich uso de esta información.

## 2019-12-13 RX ORDER — EPINEPHRINE 0.3 MG/.3ML
0.3 INJECTION SUBCUTANEOUS
Qty: 1 SYRINGE | Refills: 2 | Status: SHIPPED | OUTPATIENT
Start: 2019-12-13 | End: 2019-12-13

## 2020-10-13 ENCOUNTER — VIRTUAL VISIT (OUTPATIENT)
Dept: FAMILY MEDICINE CLINIC | Age: 65
End: 2020-10-13

## 2020-10-13 DIAGNOSIS — Z91.199 NO-SHOW FOR APPOINTMENT: Primary | ICD-10-CM

## 2020-10-13 NOTE — PROGRESS NOTES
Chief Complaint   Patient presents with    Claudication     Bilateral cramping    Follow-up     1. Have you been to the ER, urgent care clinic since your last visit? Hospitalized since your last visit? No    2. Have you seen or consulted any other health care providers outside of the [address] [place] [address] since your last visit? Include any pap smears or colon screening.  No     Health Maintenance Due   Topic    Lipid Screen     Shingrix Vaccine Age 50> (1 of 2)    FOBT Q1Y Age 54-65    Dylon Tate GLAUCOMA SCREENING Q2Y     Bone Densitometry (Dexa) Screening     Flu Vaccine (1)

## 2020-10-26 NOTE — PROGRESS NOTES
A user error has taken place: encounter opened in error, closed for administrative reasons.  Did not respond to texts to start visit

## 2021-03-15 ENCOUNTER — VIRTUAL VISIT (OUTPATIENT)
Dept: FAMILY MEDICINE CLINIC | Age: 66
End: 2021-03-15
Payer: MEDICARE

## 2021-03-15 DIAGNOSIS — Z00.00 ENCOUNTER FOR MEDICAL EXAMINATION TO ESTABLISH CARE: Primary | ICD-10-CM

## 2021-03-15 DIAGNOSIS — B18.2 CHRONIC HEPATITIS C WITHOUT HEPATIC COMA (HCC): ICD-10-CM

## 2021-03-15 PROCEDURE — G9899 SCRN MAM PERF RSLTS DOC: HCPCS | Performed by: STUDENT IN AN ORGANIZED HEALTH CARE EDUCATION/TRAINING PROGRAM

## 2021-03-15 PROCEDURE — 1090F PRES/ABSN URINE INCON ASSESS: CPT | Performed by: STUDENT IN AN ORGANIZED HEALTH CARE EDUCATION/TRAINING PROGRAM

## 2021-03-15 PROCEDURE — G0463 HOSPITAL OUTPT CLINIC VISIT: HCPCS | Performed by: STUDENT IN AN ORGANIZED HEALTH CARE EDUCATION/TRAINING PROGRAM

## 2021-03-15 PROCEDURE — G8432 DEP SCR NOT DOC, RNG: HCPCS | Performed by: STUDENT IN AN ORGANIZED HEALTH CARE EDUCATION/TRAINING PROGRAM

## 2021-03-15 PROCEDURE — 1101F PT FALLS ASSESS-DOCD LE1/YR: CPT | Performed by: STUDENT IN AN ORGANIZED HEALTH CARE EDUCATION/TRAINING PROGRAM

## 2021-03-15 PROCEDURE — G8427 DOCREV CUR MEDS BY ELIG CLIN: HCPCS | Performed by: STUDENT IN AN ORGANIZED HEALTH CARE EDUCATION/TRAINING PROGRAM

## 2021-03-15 PROCEDURE — 99213 OFFICE O/P EST LOW 20 MIN: CPT | Performed by: STUDENT IN AN ORGANIZED HEALTH CARE EDUCATION/TRAINING PROGRAM

## 2021-03-15 PROCEDURE — 3017F COLORECTAL CA SCREEN DOC REV: CPT | Performed by: STUDENT IN AN ORGANIZED HEALTH CARE EDUCATION/TRAINING PROGRAM

## 2021-03-15 PROCEDURE — G8400 PT W/DXA NO RESULTS DOC: HCPCS | Performed by: STUDENT IN AN ORGANIZED HEALTH CARE EDUCATION/TRAINING PROGRAM

## 2021-03-15 NOTE — PROGRESS NOTES
Román Waters is a 77 y.o.  female and presents with    Chief Complaint   Patient presents with   [facility],[address], who was evaluated through a synchronous (real-time) audio-video encounter, and/or her healthcare decision maker, is aware that it is a billable service, with coverage as determined by her insurance carrier. She provided verbal consent to proceed: Yes, and patient identification was verified. This visit was conducted pursuant to the emergency declaration under the [facility], [address] [place] [address] authority and the [employer] and [employer] General Act. A caregiver was present when appropriate. Ability to conduct physical exam was limited. The patient was located in a state where the provider was credentialed to provide care. --Giselle aJy MD on 3/15/2021 at 9:38 AM        Subjective:  Previous pt of Dr. Estuardo Addison. Recent had to go to ED for an upset stomach ( diarrhea, green, w/mucus) for 1 week. She went to the [facility], bc she was not feeling well, and was dizzy. She was given azythromycin. This calmed her stomach. Daughter had also been feeling the same prior to her. Pt states this has resolved at this time. She also has hx of  hep C infection- was treated for this. Per pt was told in 07/2020 that she did not longer hep C    Pt denies taking any medication at this time    There is no problem list on file for this patient.      Past Medical History:   Diagnosis Date    External hemorrhoids     Helicobacter pylori gastritis     Hepatitis C infection     Peanut allergy       Past Surgical History:   Procedure Laterality Date    HX BACK SURGERY  07/2006    Lumbar disc    HX HEENT  1987    Widened nasal passages    HX REFRACTIVE SURGERY Bilateral 2000      Family History   Problem Relation Age of Onset    Pancreatic Cancer Father      Social History     Socioeconomic History    Marital status: LEGALLY      Spouse name: Not on file    Number of children: Not on file    Years of education: Not on file    Highest education level: Not on file   Occupational History    Not on file   Social Needs    Financial resource strain: Not on file    Food insecurity     Worry: Not on file     Inability: Not on file    Transportation needs     Medical: Not on file     Non-medical: Not on file   Tobacco Use    Smoking status: Never Smoker    Smokeless tobacco: Never Used   Substance and Sexual Activity    Alcohol use: Not Currently    Drug use: Never    Sexual activity: Not on file   Lifestyle    Physical activity     Days per week: Not on file     Minutes per session: Not on file    Stress: Not on file   Relationships    Social connections     Talks on phone: Not on file     Gets together: Not on file     Attends Jainism service: Not on file     Active member of club or organization: Not on file     Attends meetings of clubs or organizations: Not on file     Relationship status: Not on file    Intimate partner violence     Fear of current or ex partner: Not on file     Emotionally abused: Not on file     Physically abused: Not on file     Forced sexual activity: Not on file   Other Topics Concern    Not on file   Social History Narrative    Not on file             ROS   Review of Systems   Constitutional: Negative for chills, fever and malaise/fatigue. HENT: Negative for congestion, ear discharge and ear pain. Eyes: Negative for blurred vision, pain and discharge. Respiratory: Negative for cough and shortness of breath. Cardiovascular: Negative for chest pain and palpitations. Gastrointestinal: Negative for abdominal pain, nausea and vomiting. Genitourinary: Negative for dysuria, frequency and urgency. Skin: Negative for itching and rash. Neurological: Negative for dizziness, seizures, loss of consciousness and headaches.    Psychiatric/Behavioral: Negative for substance abuse. Objective: There were no vitals filed for this visit. Physical Exam  Constitutional:       Appearance: Normal appearance. Eyes:      General: No scleral icterus. Right eye: No discharge. Left eye: No discharge. Neck:      Musculoskeletal: Normal range of motion and neck supple. Pulmonary:      Effort: Pulmonary effort is normal. No respiratory distress. Neurological:      Mental Status: She is alert and oriented to person, place, and time. Cranial Nerves: No cranial nerve deficit. Psychiatric:         Mood and Affect: Mood normal.         Behavior: Behavior normal.         Thought Content:  Thought content normal.         Judgment: Judgment normal.           LABS   CBC/Diff Ambiguous DefaultResulted: 7/11/2020 8:09 AM  [facility]  Component Name Value Ref Range   WBC 4.0 3.4 - 10.8 x10E3/uL   RBC 4.07 3.77 - 5.28 x10E6/uL   Hemoglobin 12.2 11.1 - 15.9 g/dL   Hematocrit 36.3 34 - 46.6 %   MCV 89 79 - 97 fL   MCH 30.0 26.6 - 33 pg   MCHC 33.6 31.5 - 35.7 g/dL   RDW 12.7 11.7 - 15.4 %   Platelets 133 339 - 518 x10E3/uL   Neutrophils 63 Not Estab. %   Lymphs 25 Not Estab. %   Monocytes 8 Not Estab. %   Eos 3 Not Estab. %   Basos 1 Not Estab. %   Neutrophils (Absolute) 2.5 1.4 - 7 x10E3/uL   Lymphs (Absolute) 1.0 0.7 - 3.1 x10E3/uL   Monocytes(Absolute) 0.3 0.1 - 0.9 x10E3/uL   Eos (Absolute) 0.1 0 - 0.4 x10E3/uL   Baso (Absolute) 0.1 0 - 0.2 x10E3/uL   Comprehensive Metabolic PanelResulted: 7/52/7270 8:09 AM  [facility]  Component Name Value Ref Range   Glucose, Serum 88 65 - 99 mg/dL   Blood Urea Nitrogen 12 8 - 27 mg/dL   Creatinine, Serum 0.75 0.57 - 1 mg/dL   eGFR If NonAfricn Am 84 >59 mL/min/1.73   eGFR If Africn Am 97 >59 mL/min/1.73   BUN/Creatinine Ratio 16 12 - 28    Sodium, Serum 143 134 - 144 mmol/L   Potassium Level 4.0 3.5 - 5.2 mmol/L   Chloride, Serum 106 96 - 106 mmol/L   Carbon Dioxide, Total 23 20 - 29 mmol/L Calcium, Serum 9.0 8.7 - 10.3 mg/dL   Protein, Total, Serum 7.4 6 - 8.5 g/dL   Albumin, Serum 4.5 3.8 - 4.8 g/dL   Globulin, Total 2.9 1.5 - 4.5 g/dL   A/G Ratio 1.6 1.2 - 2.2    Bilirubin, Total 0.7 0 - 1.2 mg/dL   Alkaline Phosphatase, S 34 (L) 39 - 117 IU/L   AST (SGOT) 17 0 - 40 IU/L   ALT (SGPT) 12 0 - 32 IU/L   Care Everywhere Result Report  HCV RT-PCR, Quant (Graph)Resulted: 7/11/2020 8:09 AM  [place]  Component Name Value Ref Range   Hepatitis C Quantitation HCV Not Detected IU/mL   Test Information: Comment   Comment: The quantitative range of this assay is 15 IU/mL to 100 million IU/mL. TESTS      Assessment/Plan:    1. Chronic hepatitis C without hepatic coma (HCC)  Resolved    2. Encounter for medical examination to establish care  Will be pt new PCP      Lab review: labs are reviewed, up to date and normal      I have discussed the diagnosis with the patient and the intended plan as seen in the above orders. Questions were answered concerning future plans. I have reviewed the plan of care with the patient, accepted their input and they are in agreement with the treatment goals.          Jesse Carrasco MD

## 2021-04-16 ENCOUNTER — VIRTUAL VISIT (OUTPATIENT)
Dept: FAMILY MEDICINE CLINIC | Age: 66
End: 2021-04-16
Payer: MEDICARE

## 2021-04-16 DIAGNOSIS — M54.32 SCIATICA OF LEFT SIDE: Primary | ICD-10-CM

## 2021-04-16 PROCEDURE — 1090F PRES/ABSN URINE INCON ASSESS: CPT | Performed by: STUDENT IN AN ORGANIZED HEALTH CARE EDUCATION/TRAINING PROGRAM

## 2021-04-16 PROCEDURE — 1101F PT FALLS ASSESS-DOCD LE1/YR: CPT | Performed by: STUDENT IN AN ORGANIZED HEALTH CARE EDUCATION/TRAINING PROGRAM

## 2021-04-16 PROCEDURE — 3017F COLORECTAL CA SCREEN DOC REV: CPT | Performed by: STUDENT IN AN ORGANIZED HEALTH CARE EDUCATION/TRAINING PROGRAM

## 2021-04-16 PROCEDURE — G9899 SCRN MAM PERF RSLTS DOC: HCPCS | Performed by: STUDENT IN AN ORGANIZED HEALTH CARE EDUCATION/TRAINING PROGRAM

## 2021-04-16 PROCEDURE — G8400 PT W/DXA NO RESULTS DOC: HCPCS | Performed by: STUDENT IN AN ORGANIZED HEALTH CARE EDUCATION/TRAINING PROGRAM

## 2021-04-16 PROCEDURE — 99213 OFFICE O/P EST LOW 20 MIN: CPT | Performed by: STUDENT IN AN ORGANIZED HEALTH CARE EDUCATION/TRAINING PROGRAM

## 2021-04-16 PROCEDURE — G8428 CUR MEDS NOT DOCUMENT: HCPCS | Performed by: STUDENT IN AN ORGANIZED HEALTH CARE EDUCATION/TRAINING PROGRAM

## 2021-04-16 PROCEDURE — G8432 DEP SCR NOT DOC, RNG: HCPCS | Performed by: STUDENT IN AN ORGANIZED HEALTH CARE EDUCATION/TRAINING PROGRAM

## 2021-04-16 RX ORDER — CYCLOBENZAPRINE HCL 5 MG
5 TABLET ORAL
Qty: 20 TAB | Refills: 0 | Status: SHIPPED | OUTPATIENT
Start: 2021-04-16 | End: 2021-04-26

## 2021-04-16 RX ORDER — IBUPROFEN 600 MG/1
600 TABLET ORAL 2 TIMES DAILY
Qty: 6 TAB | Refills: 0 | Status: SHIPPED | OUTPATIENT
Start: 2021-04-16 | End: 2021-04-19

## 2021-04-16 NOTE — PROGRESS NOTES
Abigail Yang is a 77 y.o.  female and presents with    No chief complaint on file. Abigail Yang, who was evaluated through a synchronous (real-time) audio-video encounter, and/or her healthcare decision maker, is aware that it is a billable service, with coverage as determined by her insurance carrier. She provided verbal consent to proceed: Yes, and patient identification was verified. This visit was conducted pursuant to the emergency declaration under the [address] [place] [address] and the [employer] and [employer] General Act. A caregiver was present when appropriate. Ability to conduct physical exam was limited. The patient was located in a state where the provider was credentialed to provide care. --Lea Liao MD on 4/16/2021 at 8:14 AM        Subjective:    Pt states that for the last week she has been having LLE pain. Feel this is coming from her groin area down to her knee. States at first the pain was significant. Felt it was aching and burning at the same time, however, after 2 days she feels the pain became intermittent. She has been alternating cold and warm compress on the area. 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 Pt feels it could be secondary to the COVID shot that she received around the same time. Patient denies redness, induration, or swelling to the area. There is no problem list on file for this patient.      Past Medical History:   Diagnosis Date    External hemorrhoids     Helicobacter pylori gastritis     Hepatitis C infection     Peanut allergy       Past Surgical History:   Procedure Laterality Date    HX BACK SURGERY  07/2006    Lumbar disc    HX HEENT  1987    Widened nasal passages    HX REFRACTIVE SURGERY Bilateral 2000      Family History   Problem Relation Age of Onset    Pancreatic Cancer Father      Social History     Socioeconomic History  Marital status: LEGALLY      Spouse name: Not on file    Number of children: Not on file    Years of education: Not on file    Highest education level: Not on file   Occupational History    Not on file   Social Needs    Financial resource strain: Not on file    Food insecurity     Worry: Not on file     Inability: Not on file    Transportation needs     Medical: Not on file     Non-medical: Not on file   Tobacco Use    Smoking status: Never Smoker    Smokeless tobacco: Never Used   Substance and Sexual Activity    Alcohol use: Not Currently    Drug use: Never    Sexual activity: Not on file   Lifestyle    Physical activity     Days per week: Not on file     Minutes per session: Not on file    Stress: Not on file   Relationships    Social connections     Talks on phone: Not on file     Gets together: Not on file     Attends Adventist service: Not on file     Active member of club or organization: Not on file     Attends meetings of clubs or organizations: Not on file     Relationship status: Not on file    Intimate partner violence     Fear of current or ex partner: Not on file     Emotionally abused: Not on file     Physically abused: Not on file     Forced sexual activity: Not on file   Other Topics Concern    Not on file   Social History Narrative    Not on file             ROS   Review of Systems   Constitutional: Negative for chills, fever and malaise/fatigue. HENT: Negative for congestion, ear discharge and ear pain. Eyes: Negative for blurred vision, pain and discharge. Respiratory: Negative for cough and shortness of breath. Cardiovascular: Negative for chest pain and palpitations. Gastrointestinal: Negative for abdominal pain, nausea and vomiting. Genitourinary: Negative for dysuria, frequency and urgency. Skin: Negative for itching and rash. Neurological: Negative for dizziness, seizures, loss of consciousness and headaches.    Psychiatric/Behavioral: Negative for substance abuse. Objective: There were no vitals filed for this visit. Physical Exam  Constitutional:       Appearance: Normal appearance. Eyes:      General: No scleral icterus. Right eye: No discharge. Left eye: No discharge. Neck:      Musculoskeletal: Normal range of motion and neck supple. Pulmonary:      Effort: Pulmonary effort is normal. No respiratory distress. Neurological:      Mental Status: She is alert and oriented to person, place, and time. Cranial Nerves: No cranial nerve deficit. Psychiatric:         Mood and Affect: Mood normal.         Behavior: Behavior normal.         Thought Content: Thought content normal.         Judgment: Judgment normal.           LABS     TESTS      Assessment/Plan:    1. Sciatica of left side  Discussed with patient that if it has not improved then she is to make an appt in pt in order to be further evaluated. Discussed with patient to be careful not to take medication if she is going to be driving or if she feels she need to make important decision due to possible side effects from this medication.  - ibuprofen (MOTRIN) 600 mg tablet; Take 1 Tab by mouth two (2) times a day for 3 days. Dispense: 6 Tab; Refill: 0  - cyclobenzaprine (FLEXERIL) 5 mg tablet; Take 1 Tab by mouth two (2) times daily as needed for Muscle Spasm(s) for up to 10 days. Dispense: 20 Tab; Refill: 0      Lab review: no lab studies available for review at time of visit      On this date 04/16/2021 I have spent 25 minutes reviewing previous notes, test results and face to face (virtual) with the patient discussing the diagnosis and importance of compliance with the treatment plan as well as documenting on the day of the visit. I have discussed the diagnosis with the patient and the intended plan as seen in the above orders. Questions were answered concerning future plans.   I have discussed medication side effects and warnings with the patient as well. I have reviewed the plan of care with the patient, accepted their input and they are in agreement with the treatment goals.          Akila Lucas MD

## 2021-08-16 ENCOUNTER — OFFICE VISIT (OUTPATIENT)
Dept: FAMILY MEDICINE CLINIC | Age: 66
End: 2021-08-16
Payer: MEDICARE

## 2021-08-16 VITALS
OXYGEN SATURATION: 99 % | TEMPERATURE: 97.9 F | DIASTOLIC BLOOD PRESSURE: 81 MMHG | WEIGHT: 120.6 LBS | SYSTOLIC BLOOD PRESSURE: 119 MMHG | HEART RATE: 77 BPM | RESPIRATION RATE: 20 BRPM | BODY MASS INDEX: 26.1 KG/M2

## 2021-08-16 DIAGNOSIS — Z12.11 SCREEN FOR COLON CANCER: ICD-10-CM

## 2021-08-16 DIAGNOSIS — R68.89 OTHER GENERAL SYMPTOMS AND SIGNS: ICD-10-CM

## 2021-08-16 DIAGNOSIS — R42 VERTIGO: Primary | ICD-10-CM

## 2021-08-16 DIAGNOSIS — K76.9 LIVER DISEASE, UNSPECIFIED: ICD-10-CM

## 2021-08-16 DIAGNOSIS — M81.6 LOCALIZED OSTEOPOROSIS WITHOUT CURRENT PATHOLOGICAL FRACTURE: ICD-10-CM

## 2021-08-16 PROCEDURE — G8419 CALC BMI OUT NRM PARAM NOF/U: HCPCS | Performed by: STUDENT IN AN ORGANIZED HEALTH CARE EDUCATION/TRAINING PROGRAM

## 2021-08-16 PROCEDURE — G8510 SCR DEP NEG, NO PLAN REQD: HCPCS | Performed by: STUDENT IN AN ORGANIZED HEALTH CARE EDUCATION/TRAINING PROGRAM

## 2021-08-16 PROCEDURE — 99214 OFFICE O/P EST MOD 30 MIN: CPT | Performed by: STUDENT IN AN ORGANIZED HEALTH CARE EDUCATION/TRAINING PROGRAM

## 2021-08-16 PROCEDURE — 3017F COLORECTAL CA SCREEN DOC REV: CPT | Performed by: STUDENT IN AN ORGANIZED HEALTH CARE EDUCATION/TRAINING PROGRAM

## 2021-08-16 PROCEDURE — G9899 SCRN MAM PERF RSLTS DOC: HCPCS | Performed by: STUDENT IN AN ORGANIZED HEALTH CARE EDUCATION/TRAINING PROGRAM

## 2021-08-16 PROCEDURE — 1090F PRES/ABSN URINE INCON ASSESS: CPT | Performed by: STUDENT IN AN ORGANIZED HEALTH CARE EDUCATION/TRAINING PROGRAM

## 2021-08-16 PROCEDURE — G8428 CUR MEDS NOT DOCUMENT: HCPCS | Performed by: STUDENT IN AN ORGANIZED HEALTH CARE EDUCATION/TRAINING PROGRAM

## 2021-08-16 PROCEDURE — 1101F PT FALLS ASSESS-DOCD LE1/YR: CPT | Performed by: STUDENT IN AN ORGANIZED HEALTH CARE EDUCATION/TRAINING PROGRAM

## 2021-08-16 PROCEDURE — G8536 NO DOC ELDER MAL SCRN: HCPCS | Performed by: STUDENT IN AN ORGANIZED HEALTH CARE EDUCATION/TRAINING PROGRAM

## 2021-08-16 NOTE — PROGRESS NOTES
Deven Salvador is a 77 y.o.  female and presents with    Chief Complaint   Patient presents with    Follow-up    Results           Subjective: While changing positions she feels like the room is spinning. She has not felt this in the past.  Denies being sick recently. Had hx of hepatitis C. Had tx for it. There is no problem list on file for this patient. Past Medical History:   Diagnosis Date    External hemorrhoids     Helicobacter pylori gastritis     Hepatitis C infection     Peanut allergy       Past Surgical History:   Procedure Laterality Date    HX BACK SURGERY  07/2006    Lumbar disc    HX HEENT  1987    Widened nasal passages    HX REFRACTIVE SURGERY Bilateral 2000      Family History   Problem Relation Age of Onset    Pancreatic Cancer Father      Social History     Socioeconomic History    Marital status: LEGALLY      Spouse name: Not on file    Number of children: Not on file    Years of education: Not on file    Highest education level: Not on file   Occupational History    Not on file   Tobacco Use    Smoking status: Never Smoker    Smokeless tobacco: Never Used   Substance and Sexual Activity    Alcohol use: Not Currently    Drug use: Never    Sexual activity: Not on file   Other Topics Concern    Not on file   Social History Narrative    Not on file     Social Determinants of Health     Financial Resource Strain:     Difficulty of Paying Living Expenses:    Food Insecurity:     Worried About Running Out of Food in the Last Year:     Brigido of Food in the Last Year:    Transportation Needs:     Lack of Transportation (Medical):      Lack of Transportation (Non-Medical):    Physical Activity:     Days of Exercise per Week:     Minutes of Exercise per Session:    Stress:     Feeling of Stress :    Social Connections:     Frequency of Communication with Friends and Family:     Frequency of Social Gatherings with Friends and Family:     Attends Anglican Services:     Active Member of Clubs or Organizations:     Attends Club or Organization Meetings:     Marital Status:    Intimate Partner Violence:     Fear of Current or Ex-Partner:     Emotionally Abused:     Physically Abused:     Sexually Abused:              ROS   Review of Systems   Constitutional: Negative for chills, fever and malaise/fatigue. HENT: Negative for congestion, ear discharge and ear pain. Eyes: Negative for blurred vision, pain and discharge. Respiratory: Negative for cough and shortness of breath. Cardiovascular: Negative for chest pain and palpitations. Gastrointestinal: Negative for abdominal pain, nausea and vomiting. Genitourinary: Negative for dysuria, frequency and urgency. Skin: Negative for itching and rash. Neurological: Positive for dizziness. Negative for seizures, loss of consciousness and headaches. Psychiatric/Behavioral: Negative for substance abuse. Objective:  Vitals:    08/16/21 1414   BP: 119/81   Pulse: 77   Resp: 20   Temp: 97.9 °F (36.6 °C)   SpO2: 99%   Weight: 120 lb 9.6 oz (54.7 kg)   PainSc:   6   PainLoc: Hip       Physical Exam  Constitutional:       Appearance: Normal appearance. Eyes:      General: No scleral icterus. Right eye: No discharge. Left eye: No discharge. Extraocular Movements:      Right eye: No nystagmus. Left eye: Nystagmus present. Conjunctiva/sclera: Conjunctivae normal.   Cardiovascular:      Rate and Rhythm: Normal rate. Pulses: Normal pulses. Pulmonary:      Effort: Pulmonary effort is normal. No respiratory distress. Breath sounds: Normal breath sounds. Musculoskeletal:      Cervical back: Normal range of motion and neck supple. Neurological:      Mental Status: She is alert and oriented to person, place, and time. Cranial Nerves: No cranial nerve deficit.    Psychiatric:         Mood and Affect: Mood normal.         Behavior: Behavior normal.         Thought Content: Thought content normal.         Judgment: Judgment normal.           LABS     TESTS      Assessment/Plan:    1. Vertigo  Gave patient exercises to relieve vertigo. At this time declines meclizine.  - TSH 3RD GENERATION; Future  - CBC WITH AUTOMATED DIFF; Future  - METABOLIC PANEL, COMPREHENSIVE; Future  - LIPID PANEL; Future    2. Screen for colon cancer  - COLOGUARD TEST (FECAL DNA COLORECTAL CANCER SCREENING); Future    3. Other general symptoms and signs   - TSH 3RD GENERATION; Future    4. Liver disease, unspecified   History of hepatitis C  - LIPID PANEL; Future    5. Localized osteoporosis without current pathological fracture  - VITAMIN D, 25 HYDROXY; Future  - PTH INTACT; Future      Lab review: orders written for new lab studies as appropriate; see orders      I have discussed the diagnosis with the patient and the intended plan as seen in the above orders. The patient has received an after-visit summary and questions were answered concerning future plans. I have discussed medication side effects and warnings with the patient as well. I have reviewed the plan of care with the patient, accepted their input and they are in agreement with the treatment goals.          Amandeep Pete MD     ----------------------------------------------------

## 2021-08-16 NOTE — PROGRESS NOTES
Daniel Pepe presents today for   Chief Complaint   Patient presents with    Follow-up    Results       Is someone accompanying this pt? no    Is the patient using any DME equipment during OV? no    Depression Screening:  3 most recent PHQ Screens 8/16/2021   Little interest or pleasure in doing things Not at all   Feeling down, depressed, irritable, or hopeless Not at all   Total Score PHQ 2 0       Learning Assessment:  Learning Assessment 8/27/2019   PRIMARY LEARNER Patient   PRIMARY LANGUAGE Upper sorbian   LEARNER PREFERENCE PRIMARY PICTURES     LISTENING     VIDEOS     DEMONSTRATION     READING   ANSWERED BY patient   RELATIONSHIP SELF       Abuse Screening:  Abuse Screening Questionnaire 8/27/2019   Do you ever feel afraid of your partner? N   Are you in a relationship with someone who physically or mentally threatens you? N   Is it safe for you to go home? Y       Fall Risk  Fall Risk Assessment, last 12 mths 8/16/2021   Able to walk? Yes   Fall in past 12 months? 0   Do you feel unsteady? 0   Are you worried about falling 0       Health Maintenance reviewed and discussed and ordered per Provider. Health Maintenance Due   Topic Date Due    COVID-19 Vaccine (1) Never done    Lipid Screen  Never done    Colorectal Cancer Screening Combo  Never done    Shingrix Vaccine Age 50> (1 of 2) Never done    Medicare Yearly Exam  Never done   . Coordination of Care:  1. Have you been to the ER, urgent care clinic since your last visit? Hospitalized since your last visit? no    2. Have you seen or consulted any other health care providers outside of the [address] [place] [address] since your last visit? Include any pap smears or colon screening.  no      Last  Checked na  Last UDS Checked na  Last Pain contract signed: na

## 2021-08-16 NOTE — PATIENT INSTRUCTIONS
Vértigo: Ejercicios  Vertigo: Exercises  Instrucciones de cuidado  Aquí se presentan algunos ejemplos de ejercicios típicos de rehabilitación para tratar rich afección. Empiece cada ejercicio lentamente. Reduzca la intensidad del ejercicio si Lonia Cough a tener dolor. Rich médico o fisioterapeuta le dirán cuándo puede comenzar con estos ejercicios y cuáles funcionarán mejor para usted. Cómo hacer los ejercicios  Ejercicio 1   1. Párese con betty silla bradley de usted y [facility] pared detrás de usted. Si empieza a caerse, puede utilizarlas para apoyarse. 2. Párese con los pies juntos y los brazos a los lados. 3. Mueva la winter de arriba abajo 10 veces. Ejercicio 2   1. Mueva la winter de lado a lado 10 veces. Ejercicio 3   1. Mueva la winter diagonalmente de arriba abajo 10 veces. Ejercicio 4   1. Mueva la winter diagonalmente de arriba abajo 10 veces del otro lado. La atención de seguimiento es betty parte clave de rich tratamiento y seguridad. Asegúrese de hacer y acudir a todas las citas, y llame a rich médico si está teniendo problemas. También es betty buena idea saber los resultados de raryay exámenes y mantener betty lista de los medicamentos que negrita. ¿Dónde puede encontrar más información en inglés? Vaya a http://www.gray.com/  Luly F349 en la búsqueda para aprender más acerca de \"Vértigo: Ejercicios. \"  Revisado: 2 diciembre, 2020               Versión del contenido: 12.8  © 9456-6417 Healthwise, Incorporated. Las instrucciones de cuidado fueron adaptadas bajo licencia por [employer] (which disclaims liability or warranty for this information). Si usted tiene [place] afección médica o sobre estas instrucciones, siempre pregunte a rich profesional de kofi. [facility] niega toda garantía o responsabilidad por rich uso de esta información.

## 2021-09-01 ENCOUNTER — OFFICE VISIT (OUTPATIENT)
Dept: FAMILY MEDICINE CLINIC | Age: 66
End: 2021-09-01
Payer: MEDICARE

## 2021-09-01 ENCOUNTER — HOSPITAL ENCOUNTER (OUTPATIENT)
Dept: LAB | Age: 66
Discharge: HOME | End: 2021-09-01
Payer: MEDICARE

## 2021-09-01 VITALS
DIASTOLIC BLOOD PRESSURE: 70 MMHG | HEART RATE: 64 BPM | BODY MASS INDEX: 26.23 KG/M2 | WEIGHT: 121.2 LBS | RESPIRATION RATE: 20 BRPM | OXYGEN SATURATION: 99 % | TEMPERATURE: 97.6 F | SYSTOLIC BLOOD PRESSURE: 114 MMHG

## 2021-09-01 DIAGNOSIS — Z00.00 INITIAL MEDICARE ANNUAL WELLNESS VISIT: ICD-10-CM

## 2021-09-01 DIAGNOSIS — N81.10 FEMALE CYSTOCELE: ICD-10-CM

## 2021-09-01 DIAGNOSIS — M81.6 LOCALIZED OSTEOPOROSIS WITHOUT CURRENT PATHOLOGICAL FRACTURE: ICD-10-CM

## 2021-09-01 DIAGNOSIS — R68.89 OTHER GENERAL SYMPTOMS AND SIGNS: ICD-10-CM

## 2021-09-01 DIAGNOSIS — Z00.00 INITIAL MEDICARE ANNUAL WELLNESS VISIT: Primary | ICD-10-CM

## 2021-09-01 DIAGNOSIS — K76.9 LIVER DISEASE, UNSPECIFIED: ICD-10-CM

## 2021-09-01 DIAGNOSIS — M25.562 PAIN IN LATERAL PORTION OF LEFT KNEE: ICD-10-CM

## 2021-09-01 DIAGNOSIS — R42 VERTIGO: ICD-10-CM

## 2021-09-01 LAB
25(OH)D3 SERPL-MCNC: 22.5 NG/ML (ref 30–100)
ALBUMIN SERPL-MCNC: 4.1 G/DL (ref 3.4–5)
ALBUMIN/GLOB SERPL: 1.1 {RATIO} (ref 0.8–1.7)
ALP SERPL-CCNC: 44 U/L (ref 45–117)
ALT SERPL-CCNC: 17 U/L (ref 13–56)
ANION GAP SERPL CALC-SCNC: 7 MMOL/L (ref 3–18)
AST SERPL-CCNC: 14 U/L (ref 10–38)
ATRIAL RATE: 60 BPM
BASOPHILS # BLD: 0.1 K/UL (ref 0–0.1)
BASOPHILS NFR BLD: 1 % (ref 0–2)
BILIRUB SERPL-MCNC: 0.9 MG/DL (ref 0.2–1)
BUN SERPL-MCNC: 17 MG/DL (ref 7–18)
BUN/CREAT SERPL: 30 (ref 12–20)
CALCIUM SERPL-MCNC: 8.7 MG/DL (ref 8.5–10.1)
CALCIUM SERPL-MCNC: 8.8 MG/DL (ref 8.5–10.1)
CALCULATED P AXIS, ECG09: 76 DEGREES
CALCULATED R AXIS, ECG10: 72 DEGREES
CALCULATED T AXIS, ECG11: 72 DEGREES
CHLORIDE SERPL-SCNC: 108 MMOL/L (ref 100–111)
CHOLEST SERPL-MCNC: 234 MG/DL
CO2 SERPL-SCNC: 26 MMOL/L (ref 21–32)
CREAT SERPL-MCNC: 0.56 MG/DL (ref 0.6–1.3)
DIAGNOSIS, 93000: NORMAL
DIFFERENTIAL METHOD BLD: ABNORMAL
EOSINOPHIL # BLD: 0.1 K/UL (ref 0–0.4)
EOSINOPHIL NFR BLD: 1 % (ref 0–5)
ERYTHROCYTE [DISTWIDTH] IN BLOOD BY AUTOMATED COUNT: 14.6 % (ref 11.6–14.5)
GLOBULIN SER CALC-MCNC: 3.6 G/DL (ref 2–4)
GLUCOSE SERPL-MCNC: 92 MG/DL (ref 74–99)
HCT VFR BLD AUTO: 41.3 % (ref 35–45)
HDLC SERPL-MCNC: 77 MG/DL (ref 40–60)
HDLC SERPL: 3 {RATIO} (ref 0–5)
HGB BLD-MCNC: 12.9 G/DL (ref 12–16)
LDLC SERPL CALC-MCNC: 143.2 MG/DL (ref 0–100)
LIPID PROFILE,FLP: ABNORMAL
LYMPHOCYTES # BLD: 1 K/UL (ref 0.9–3.6)
LYMPHOCYTES NFR BLD: 17 % (ref 21–52)
MCH RBC QN AUTO: 29.2 PG (ref 24–34)
MCHC RBC AUTO-ENTMCNC: 31.2 G/DL (ref 31–37)
MCV RBC AUTO: 93.4 FL (ref 78–100)
MONOCYTES # BLD: 0.3 K/UL (ref 0.05–1.2)
MONOCYTES NFR BLD: 6 % (ref 3–10)
NEUTS SEG # BLD: 4.4 K/UL (ref 1.8–8)
NEUTS SEG NFR BLD: 76 % (ref 40–73)
P-R INTERVAL, ECG05: 144 MS
PLATELET # BLD AUTO: 210 K/UL (ref 135–420)
PMV BLD AUTO: 11.3 FL (ref 9.2–11.8)
POTASSIUM SERPL-SCNC: 3.9 MMOL/L (ref 3.5–5.5)
PROT SERPL-MCNC: 7.7 G/DL (ref 6.4–8.2)
PTH-INTACT SERPL-MCNC: 38.9 PG/ML (ref 18.4–88)
Q-T INTERVAL, ECG07: 436 MS
QRS DURATION, ECG06: 80 MS
QTC CALCULATION (BEZET), ECG08: 436 MS
RBC # BLD AUTO: 4.42 M/UL (ref 4.2–5.3)
SODIUM SERPL-SCNC: 141 MMOL/L (ref 136–145)
TRIGL SERPL-MCNC: 69 MG/DL (ref ?–150)
TSH SERPL DL<=0.05 MIU/L-ACNC: 1.03 UIU/ML (ref 0.36–3.74)
VENTRICULAR RATE, ECG03: 60 BPM
VLDLC SERPL CALC-MCNC: 13.8 MG/DL
WBC # BLD AUTO: 5.8 K/UL (ref 4.6–13.2)

## 2021-09-01 PROCEDURE — G8510 SCR DEP NEG, NO PLAN REQD: HCPCS | Performed by: STUDENT IN AN ORGANIZED HEALTH CARE EDUCATION/TRAINING PROGRAM

## 2021-09-01 PROCEDURE — 36415 COLL VENOUS BLD VENIPUNCTURE: CPT

## 2021-09-01 PROCEDURE — G8428 CUR MEDS NOT DOCUMENT: HCPCS | Performed by: STUDENT IN AN ORGANIZED HEALTH CARE EDUCATION/TRAINING PROGRAM

## 2021-09-01 PROCEDURE — 1090F PRES/ABSN URINE INCON ASSESS: CPT | Performed by: STUDENT IN AN ORGANIZED HEALTH CARE EDUCATION/TRAINING PROGRAM

## 2021-09-01 PROCEDURE — 80053 COMPREHEN METABOLIC PANEL: CPT

## 2021-09-01 PROCEDURE — 80061 LIPID PANEL: CPT

## 2021-09-01 PROCEDURE — 3017F COLORECTAL CA SCREEN DOC REV: CPT | Performed by: STUDENT IN AN ORGANIZED HEALTH CARE EDUCATION/TRAINING PROGRAM

## 2021-09-01 PROCEDURE — 83970 ASSAY OF PARATHORMONE: CPT

## 2021-09-01 PROCEDURE — G8536 NO DOC ELDER MAL SCRN: HCPCS | Performed by: STUDENT IN AN ORGANIZED HEALTH CARE EDUCATION/TRAINING PROGRAM

## 2021-09-01 PROCEDURE — 84443 ASSAY THYROID STIM HORMONE: CPT

## 2021-09-01 PROCEDURE — 99214 OFFICE O/P EST MOD 30 MIN: CPT | Performed by: STUDENT IN AN ORGANIZED HEALTH CARE EDUCATION/TRAINING PROGRAM

## 2021-09-01 PROCEDURE — 82306 VITAMIN D 25 HYDROXY: CPT

## 2021-09-01 PROCEDURE — G0438 PPPS, INITIAL VISIT: HCPCS | Performed by: STUDENT IN AN ORGANIZED HEALTH CARE EDUCATION/TRAINING PROGRAM

## 2021-09-01 PROCEDURE — G9899 SCRN MAM PERF RSLTS DOC: HCPCS | Performed by: STUDENT IN AN ORGANIZED HEALTH CARE EDUCATION/TRAINING PROGRAM

## 2021-09-01 PROCEDURE — G8419 CALC BMI OUT NRM PARAM NOF/U: HCPCS | Performed by: STUDENT IN AN ORGANIZED HEALTH CARE EDUCATION/TRAINING PROGRAM

## 2021-09-01 PROCEDURE — 1101F PT FALLS ASSESS-DOCD LE1/YR: CPT | Performed by: STUDENT IN AN ORGANIZED HEALTH CARE EDUCATION/TRAINING PROGRAM

## 2021-09-01 PROCEDURE — 93005 ELECTROCARDIOGRAM TRACING: CPT

## 2021-09-01 PROCEDURE — 85025 COMPLETE CBC W/AUTO DIFF WBC: CPT

## 2021-09-01 RX ORDER — DICLOFENAC SODIUM 10 MG/G
2 GEL TOPICAL 4 TIMES DAILY
Qty: 100 G | Refills: 0 | Status: SHIPPED | OUTPATIENT
Start: 2021-09-01

## 2021-09-01 NOTE — PATIENT INSTRUCTIONS
Medicare Wellness Visit, Female     The best way to live healthy is to have a lifestyle where you eat a well-balanced diet, exercise regularly, limit alcohol use, and quit all forms of tobacco/nicotine, if applicable. Regular preventive services are another way to keep healthy. Preventive services (vaccines, screening tests, monitoring & exams) can help personalize your care plan, which helps you manage your own care. Screening tests can find health problems at the earliest stages, when they are easiest to treat. [facility] follows the current, evidence-based guidelines published by the [facility] ([facility]) when recommending preventive services for our patients. Because we follow these guidelines, sometimes recommendations change over time as research supports it. (For example, mammograms used to be recommended annually. Even though Medicare will still pay for an annual mammogram, the newer guidelines recommend a mammogram every two years for women of average risk). Of course, you and your doctor may decide to screen more often for some diseases, based on your risk and your co-morbidities (chronic disease you are already diagnosed with). Preventive services for you include:  - Medicare offers their members a free annual wellness visit, which is time for you and your primary care provider to discuss and plan for your preventive service needs. Take advantage of this benefit every year!  -All adults over the age of 72 should receive the recommended pneumonia vaccines. Current USPSTF guidelines recommend a series of two vaccines for the best pneumonia protection.   -All adults should have a flu vaccine yearly and a tetanus vaccine every 10 years.   -All adults age 48 and older should receive the shingles vaccines (series of two vaccines).       -All adults age 38-68 who are overweight should have a diabetes screening test once every three years.   -All adults born between [birth year] and 1965 should be screened once for Hepatitis C.  -Other screening tests and preventive services for persons with diabetes include: an eye exam to screen for diabetic retinopathy, a kidney function test, a foot exam, and stricter control over your cholesterol.   -Cardiovascular screening for adults with routine risk involves an electrocardiogram (ECG) at intervals determined by your doctor.   -Colorectal cancer screenings should be done for adults age 54-65 with no increased risk factors for colorectal cancer. There are a number of acceptable methods of screening for this type of cancer. Each test has its own benefits and drawbacks. Discuss with your doctor what is most appropriate for you during your annual wellness visit. The different tests include: colonoscopy (considered the best screening method), a fecal occult blood test, a fecal DNA test, and sigmoidoscopy.    -A bone mass density test is recommended when a woman turns 65 to screen for osteoporosis. This test is only recommended one time, as a screening. Some providers will use this same test as a disease monitoring tool if you already have osteoporosis. -Breast cancer screenings are recommended every other year for women of normal risk, age 54-69.  -Cervical cancer screenings for women over age 72 are only recommended with certain risk factors.      Here is a list of your current Health Maintenance items (your personalized list of preventive services) with a due date:  Health Maintenance Due   Topic Date Due    COVID-19 Vaccine (1) Never done    Cholesterol Test   Never done    Colorectal Screening  Never done    Shingles Vaccine (1 of 2) Never done    Yearly Flu Vaccine (1) 09/01/2021

## 2021-09-01 NOTE — PROGRESS NOTES
Quang Calixto is a 77 y.o.  female and presents with    Chief Complaint   Patient presents with    Follow-up           Subjective:  Patient states that she has been doing better when it comes to her sciatic pain. However she feels now that lateral aspect of her left knee has been aching her. It is tender to palpation. Is not causing her to have issues with movement. She states range of motion preserved. Patient also has concerns regarding a possible cystocele that she may have. States that she felt the other day when watching a protrusion in her vagina. She is concerned because she is about to start in a new relationship with a nini and is concerned that this may cause pain if they have intercourse. She denies urinary issues at this time. There is no problem list on file for this patient.      Past Medical History:   Diagnosis Date    External hemorrhoids     Helicobacter pylori gastritis     Hepatitis C infection     Peanut allergy       Past Surgical History:   Procedure Laterality Date    HX BACK SURGERY  07/2006    Lumbar disc    HX HEENT  1987    Widened nasal passages    HX REFRACTIVE SURGERY Bilateral 2000      Family History   Problem Relation Age of Onset    Pancreatic Cancer Father      Social History     Socioeconomic History    Marital status: LEGALLY      Spouse name: Not on file    Number of children: Not on file    Years of education: Not on file    Highest education level: Not on file   Occupational History    Not on file   Tobacco Use    Smoking status: Never Smoker    Smokeless tobacco: Never Used   Substance and Sexual Activity    Alcohol use: Not Currently    Drug use: Never    Sexual activity: Not on file   Other Topics Concern    Not on file   Social History Narrative    Not on file     Social Determinants of Health     Financial Resource Strain:     Difficulty of Paying Living Expenses:    Food Insecurity:     Worried About Running Out of [employer] in the Last Year:    [address] in the Last Year:    Transportation Needs:     Lack of Transportation (Medical):  Lack of Transportation (Non-Medical):    Physical Activity:     Days of Exercise per Week:     Minutes of Exercise per Session:    Stress:     Feeling of Stress :    Social Connections:     Frequency of Communication with Friends and Family:     Frequency of Social Gatherings with Friends and Family:     Attends Confucianist Services:     Active Member of Clubs or Organizations:     Attends Club or Organization Meetings:     Marital Status:    Intimate Partner Violence:     Fear of Current or Ex-Partner:     Emotionally Abused:     Physically Abused:     Sexually Abused:              ROS   Review of Systems   Constitutional: Negative for chills, fever and malaise/fatigue. HENT: Negative for congestion, ear discharge and ear pain. Eyes: Negative for blurred vision, pain and discharge. Respiratory: Negative for cough and shortness of breath. Cardiovascular: Negative for chest pain and palpitations. Gastrointestinal: Negative for abdominal pain, nausea and vomiting. Genitourinary: Negative for dysuria, frequency and urgency. Musculoskeletal: Positive for joint pain. Skin: Negative for itching and rash. Neurological: Negative for dizziness, seizures, loss of consciousness and headaches. Psychiatric/Behavioral: Negative for substance abuse. Objective:  Vitals:    09/01/21 1003   BP: 114/70   Pulse: 64   Resp: 20   Temp: 97.6 °F (36.4 °C)   SpO2: 99%   Weight: 121 lb 3.2 oz (55 kg)   PainSc:   6   PainLoc: Knee       Physical Exam  Constitutional:       Appearance: Normal appearance. Eyes:      General: No scleral icterus. Right eye: No discharge. Left eye: No discharge. Cardiovascular:      Rate and Rhythm: Normal rate and regular rhythm. Pulses: Normal pulses.    Pulmonary:      Effort: Pulmonary effort is normal. No respiratory distress. Breath sounds: Normal breath sounds. Genitourinary:     Pubic Area: No rash or pubic lice. Labia:         Right: No rash, tenderness, lesion or injury. Left: No rash, tenderness, lesion or injury. Urethra: Prolapse present. Musculoskeletal:      Cervical back: Normal range of motion and neck supple. Neurological:      Mental Status: She is alert and oriented to person, place, and time. Cranial Nerves: No cranial nerve deficit. Psychiatric:         Mood and Affect: Mood normal.         Behavior: Behavior normal.         Thought Content: Thought content normal.         Judgment: Judgment normal.           LABS     TESTS      Assessment/Plan:    1. Initial Medicare annual wellness visit  See below  - EKG, 12 LEAD, INITIAL; Future    2. Female cystocele  Discussed that this should not be an issue when it comes or intercourse, however if it is recommend using plenty of lubricant. If it becomes painful then patient should wait until she sees specialist.  - REFERRAL TO FEMALE PELVIC MEDICINE AND RECONSTRUCTIVE SURGERY    3. Pain in lateral portion of left knee  Does not seem to be arthritislike. Seems to be the patient may have been putting her weight wrong on that knee. - diclofenac (VOLTAREN) 1 % gel; Apply 2 g to affected area four (4) times daily. Dispense: 100 g; Refill: 0    Lab review: no lab studies available for review at time of visit      I have discussed the diagnosis with the patient and the intended plan as seen in the above orders. The patient has received an after-visit summary and questions were answered concerning future plans. I have discussed medication side effects and warnings with the patient as well. I have reviewed the plan of care with the patient, accepted their input and they are in agreement with the treatment goals.          Jacqueline Rodriguez MD         ------------------------------------------          This is an Initial Medicare Annual Wellness Exam (AWV) (Performed 12 months after IPPE or effective date of Medicare Part B enrollment, Once in a lifetime)    I have reviewed the patient's medical history in detail and updated the computerized patient record. Assessment/Plan   Education and counseling provided:  Are appropriate based on today's review and evaluation    1. Initial Medicare annual wellness visit       Depression Risk Factor Screening     3 most recent PHQ Screens 9/1/2021   Little interest or pleasure in doing things Not at all   Feeling down, depressed, irritable, or hopeless Not at all   Total Score PHQ 2 0       Alcohol Risk Screen    Do you average more than 1 drink per night or more than 7 drinks a week:  No    On any one occasion in the past three months have you have had more than 3 drinks containing alcohol:  No         Functional Ability and Level of Safety    Hearing: Hearing is good. Activities of Daily Living: The home contains: handrails and grab bars  Patient does total self care     Ambulation: with no difficulty      Fall Risk:  Fall Risk Assessment, last 12 mths 9/1/2021   Able to walk? Yes   Fall in past 12 months? 0   Do you feel unsteady? 0   Are you worried about falling 0      Abuse Screen:  Patient is not abused       Cognitive Screening    Has your family/caregiver stated any concerns about your memory: no         Health Maintenance Due     Health Maintenance Due   Topic Date Due    COVID-19 Vaccine (1) Never done    Lipid Screen  Never done    Colorectal Cancer Screening Combo  Never done    Shingrix Vaccine Age 50> (1 of 2) Never done    Flu Vaccine (1) 09/01/2021       Patient Care Team   Patient Care Team:  Cindy Acevedo MD as PCP - General (Family Medicine)  Cindy Acevedo MD as PCP - [facility] Empaneled Provider    History   There is no problem list on file for this patient.     Past Medical History:   Diagnosis Date    External hemorrhoids     Helicobacter pylori gastritis     Hepatitis C infection     Peanut allergy       Past Surgical History:   Procedure Laterality Date    HX BACK SURGERY  07/2006    Lumbar disc    HX HEENT  1987    Widened nasal passages    HX REFRACTIVE SURGERY Bilateral 2000       Allergies   Allergen Reactions    Peanut Not Reported This Time       Family History   Problem Relation Age of Onset    Pancreatic Cancer Father      Social History     Tobacco Use    Smoking status: Never Smoker    Smokeless tobacco: Never Used   Substance Use Topics    Alcohol use: Not Currently       Nancy House MD

## 2021-09-01 NOTE — PROGRESS NOTES
Jd Graham presents today for   Chief Complaint   Patient presents with    Follow-up       Is someone accompanying this pt? no    Is the patient using any DME equipment during OV? no    Depression Screening:  3 most recent PHQ Screens 9/1/2021   Little interest or pleasure in doing things Not at all   Feeling down, depressed, irritable, or hopeless Not at all   Total Score PHQ 2 0       Learning Assessment:  Learning Assessment 8/27/2019   PRIMARY LEARNER Patient   PRIMARY LANGUAGE Marshallese   LEARNER PREFERENCE PRIMARY PICTURES     LISTENING     VIDEOS     DEMONSTRATION     READING   ANSWERED BY patient   RELATIONSHIP SELF       Abuse Screening:  Abuse Screening Questionnaire 8/16/2021   Do you ever feel afraid of your partner? N   Are you in a relationship with someone who physically or mentally threatens you? N   Is it safe for you to go home? Y       Fall Risk  Fall Risk Assessment, last 12 mths 9/1/2021   Able to walk? Yes   Fall in past 12 months? 0   Do you feel unsteady? 0   Are you worried about falling 0       Health Maintenance reviewed and discussed and ordered per Provider. Health Maintenance Due   Topic Date Due    COVID-19 Vaccine (1) Never done    Lipid Screen  Never done    Colorectal Cancer Screening Combo  Never done    Shingrix Vaccine Age 50> (1 of 2) Never done    Medicare Yearly Exam  Never done    Flu Vaccine (1) 09/01/2021   . Coordination of Care:  1. Have you been to the ER, urgent care clinic since your last visit? Hospitalized since your last visit? no    2. Have you seen or consulted any other health care providers outside of the [address] [place] [address] since your last visit? Include any pap smears or colon screening.  no      Last  Checked na  Last UDS Checked na  Last Pain contract signed: na

## 2023-06-06 NOTE — PROGRESS NOTES
1. Have you been to the ER, urgent care clinic or hospitalized since your last visit? NO.     2. Have you seen or consulted any other health care providers outside of the [address] [place] [address] since your last visit (Include any pap smears or colon screening)?  NO Cimzia Counseling:  I discussed with the patient the risks of Cimzia including but not limited to immunosuppression, allergic reactions and infections.  The patient understands that monitoring is required including a PPD at baseline and must alert us or the primary physician if symptoms of infection or other concerning signs are noted.

## 2024-02-15 ENCOUNTER — HOSPITAL ENCOUNTER (OUTPATIENT)
Facility: HOSPITAL | Age: 69
Setting detail: SPECIMEN
Discharge: HOME | End: 2024-02-15
Payer: COMMERCIAL

## 2024-02-15 ENCOUNTER — OFFICE VISIT (OUTPATIENT)
Facility: CLINIC | Age: 69
End: 2024-02-15

## 2024-02-15 VITALS
DIASTOLIC BLOOD PRESSURE: 66 MMHG | SYSTOLIC BLOOD PRESSURE: 106 MMHG | BODY MASS INDEX: 26.97 KG/M2 | OXYGEN SATURATION: 99 % | WEIGHT: 125 LBS | HEIGHT: 57 IN | RESPIRATION RATE: 16 BRPM | TEMPERATURE: 98 F | HEART RATE: 70 BPM

## 2024-02-15 DIAGNOSIS — R10.13 DYSPEPSIA: ICD-10-CM

## 2024-02-15 DIAGNOSIS — R10.13 DYSPEPSIA: Primary | ICD-10-CM

## 2024-02-15 DIAGNOSIS — Z00.00 ENCOUNTER FOR ROUTINE ADULT MEDICAL EXAMINATION: ICD-10-CM

## 2024-02-15 DIAGNOSIS — A04.8 H. PYLORI INFECTION: ICD-10-CM

## 2024-02-15 PROCEDURE — 83013 H PYLORI (C-13) BREATH: CPT

## 2024-02-15 NOTE — PROGRESS NOTES
Fiona Hart is a 69 y.o. year old female who presents today for   Chief Complaint   Patient presents with    Follow-up       Is someone accompanying this pt? Yes, wife    Is the patient using any DME equipment during OV? no    Depression Screenin/15/2024     2:22 PM 2/15/2024     2:20 PM 2021     9:59 AM 2021     2:13 PM   PHQ-9 Questionaire   Little interest or pleasure in doing things 0 0 0 0   Feeling down, depressed, or hopeless 0 0 0 0   PHQ-9 Total Score 0 0 0 0       Abuse Screenin/15/2024     2:00 PM   AMB Abuse Screening   Do you ever feel afraid of your partner? N   Are you in a relationship with someone who physically or mentally threatens you? N   Is it safe for you to go home? Y       Learning Assessment:  No question data found.    Fall Risk:      2/15/2024     2:20 PM   Fall Risk   2 or more falls in past year? no   Fall with injury in past year? no           Coordination of Care:   1. \"Have you been to the ER, urgent care clinic since your last visit?  Hospitalized since your last visit?\" no    2. \"Have you seen or consulted any other health care providers outside of the [facility] since your last visit?\" no    3. For patients aged 45-75: Has the patient had a colonoscopy / FIT/ Cologuard? Not    If the patient is female:    4. For patients aged 40-74: Has the patient had a mammogram within the past 2 years? No    5. For patients aged 21-65: Has the patient had a pap smear? No     Health Maintenance: reviewed and discussed and ordered per Provider.    Health Maintenance Due   Topic Date Due    Depression Screen  Never done    Colorectal Cancer Screen  Never done    Shingles vaccine (1 of 2) Never done    Respiratory Syncytial Virus (RSV) Pregnant or age 60 yrs+ (1 - 1-dose 60+ series) Never done    Pneumococcal 65+ years Vaccine (2 - PCV) 2020    Flu vaccine (1) 2023    COVID-19 Vaccine (4 - -24 season) 2023        - Vivian Sullivan

## 2024-02-15 NOTE — PROGRESS NOTES
Fiona Hart is a 69 y.o.  female and presents with    Chief Complaint   Patient presents with   • Follow-up           Subjective:    She was being taken care by [facility]. She feels like she has the h. Pylori. She was tx for this in 11/2023. She did not had the test to see if she had this tx or not.  She does eat and drink tea, and does not wait prior to going to bed. Has not been taking Omeprazole.     She is going to have a Colposacopexia d/t cystocele. Dr. Yary freed will do it.     There is no problem list on file for this patient.     Past Medical History:   Diagnosis Date   • External hemorrhoids    • Helicobacter pylori gastritis    • Hepatitis C infection    • Peanut allergy       Past Surgical History:   Procedure Laterality Date   • BACK SURGERY  07/2006    Lumbar disc   • HEENT  1987    Widened nasal passages   • REFRACTIVE SURGERY Bilateral 2000      Family History   Problem Relation Age of Onset   • Pancreatic Cancer Father      Social History     Socioeconomic History   • Marital status:      Spouse name: Not on file   • Number of children: Not on file   • Years of education: Not on file   • Highest education level: Not on file   Occupational History   • Not on file   Tobacco Use   • Smoking status: Never   • Smokeless tobacco: Never   Substance and Sexual Activity   • Alcohol use: Not Currently   • Drug use: Never   • Sexual activity: Not on file   Other Topics Concern   • Not on file   Social History Narrative   • Not on file     Social Determinants of Health     Financial Resource Strain: Low Risk  (2/15/2024)    Overall Financial Resource Strain (CARDIA)    • Difficulty of Paying Living Expenses: Not hard at all   Food Insecurity: No Food Insecurity (2/15/2024)    Hunger Vital Sign    • Worried About Running Out of Food in the Last Year: Never true    • Ran Out of Food in the Last Year: Never true   Transportation Needs: Unknown (2/15/2024)    PRAPARE - Transportation    • Lack of

## 2024-02-17 LAB — UREA BREATH TEST QL: POSITIVE

## 2024-03-06 DIAGNOSIS — A04.8 H. PYLORI INFECTION: Primary | ICD-10-CM

## 2024-03-06 RX ORDER — CLARITHROMYCIN 500 MG/1
500 TABLET, COATED ORAL 2 TIMES DAILY
Qty: 28 TABLET | Refills: 0 | Status: SHIPPED | OUTPATIENT
Start: 2024-03-06 | End: 2024-03-20

## 2024-03-06 RX ORDER — PANTOPRAZOLE SODIUM 40 MG/1
40 TABLET, DELAYED RELEASE ORAL 2 TIMES DAILY
Qty: 28 TABLET | Refills: 0 | Status: SHIPPED | OUTPATIENT
Start: 2024-03-06 | End: 2024-03-20

## 2024-03-06 RX ORDER — AMOXICILLIN 500 MG/1
1000 CAPSULE ORAL 2 TIMES DAILY
Qty: 56 CAPSULE | Refills: 0 | Status: SHIPPED | OUTPATIENT
Start: 2024-03-06 | End: 2024-03-20

## 2024-05-23 ENCOUNTER — HOSPITAL ENCOUNTER (OUTPATIENT)
Facility: HOSPITAL | Age: 69
Setting detail: SPECIMEN
Discharge: HOME | End: 2024-05-23
Payer: COMMERCIAL

## 2024-05-23 ENCOUNTER — OFFICE VISIT (OUTPATIENT)
Facility: CLINIC | Age: 69
End: 2024-05-23
Payer: COMMERCIAL

## 2024-05-23 VITALS
HEART RATE: 76 BPM | WEIGHT: 127.6 LBS | DIASTOLIC BLOOD PRESSURE: 76 MMHG | OXYGEN SATURATION: 98 % | BODY MASS INDEX: 27.53 KG/M2 | RESPIRATION RATE: 10 BRPM | SYSTOLIC BLOOD PRESSURE: 118 MMHG | HEIGHT: 57 IN | TEMPERATURE: 97.8 F

## 2024-05-23 DIAGNOSIS — Z01.818 PREOP EXAMINATION: ICD-10-CM

## 2024-05-23 DIAGNOSIS — N81.4 UTERINE PROLAPSE: Primary | ICD-10-CM

## 2024-05-23 LAB
ALBUMIN SERPL-MCNC: 4.1 G/DL (ref 3.4–5)
ALBUMIN/GLOB SERPL: 1.1 (ref 0.8–1.7)
ALP SERPL-CCNC: 62 U/L (ref 45–117)
ALT SERPL-CCNC: 24 U/L (ref 13–56)
ANION GAP SERPL CALC-SCNC: 6 MMOL/L (ref 3–18)
APPEARANCE UR: CLEAR
APTT PPP: 29.8 SEC (ref 23–36.4)
AST SERPL-CCNC: 15 U/L (ref 10–38)
BASOPHILS # BLD: 0 K/UL (ref 0–0.1)
BASOPHILS NFR BLD: 1 % (ref 0–2)
BILIRUB SERPL-MCNC: 0.7 MG/DL (ref 0.2–1)
BILIRUB UR QL: NEGATIVE
BUN SERPL-MCNC: 21 MG/DL (ref 7–18)
BUN/CREAT SERPL: 23 (ref 12–20)
CALCIUM SERPL-MCNC: 9.5 MG/DL (ref 8.5–10.1)
CHLORIDE SERPL-SCNC: 104 MMOL/L (ref 100–111)
CO2 SERPL-SCNC: 29 MMOL/L (ref 21–32)
COLOR UR: YELLOW
CREAT SERPL-MCNC: 0.9 MG/DL (ref 0.6–1.3)
DIFFERENTIAL METHOD BLD: ABNORMAL
EOSINOPHIL # BLD: 0.2 K/UL (ref 0–0.4)
EOSINOPHIL NFR BLD: 3 % (ref 0–5)
ERYTHROCYTE [DISTWIDTH] IN BLOOD BY AUTOMATED COUNT: 15.2 % (ref 11.6–14.5)
GLOBULIN SER CALC-MCNC: 3.7 G/DL (ref 2–4)
GLUCOSE SERPL-MCNC: 124 MG/DL (ref 74–99)
GLUCOSE UR STRIP.AUTO-MCNC: NEGATIVE MG/DL
HCT VFR BLD AUTO: 41.9 % (ref 35–45)
HGB BLD-MCNC: 13 G/DL (ref 12–16)
HGB UR QL STRIP: NEGATIVE
IMM GRANULOCYTES # BLD AUTO: 0 K/UL (ref 0–0.04)
IMM GRANULOCYTES NFR BLD AUTO: 0 % (ref 0–0.5)
INR PPP: 1 (ref 0.9–1.1)
KETONES UR QL STRIP.AUTO: NEGATIVE MG/DL
LEUKOCYTE ESTERASE UR QL STRIP.AUTO: NEGATIVE
LYMPHOCYTES # BLD: 1.4 K/UL (ref 0.9–3.6)
LYMPHOCYTES NFR BLD: 24 % (ref 21–52)
MCH RBC QN AUTO: 29.4 PG (ref 24–34)
MCHC RBC AUTO-ENTMCNC: 31 G/DL (ref 31–37)
MCV RBC AUTO: 94.8 FL (ref 78–100)
MONOCYTES # BLD: 0.3 K/UL (ref 0.05–1.2)
MONOCYTES NFR BLD: 5 % (ref 3–10)
NEUTS SEG # BLD: 4 K/UL (ref 1.8–8)
NEUTS SEG NFR BLD: 68 % (ref 40–73)
NITRITE UR QL STRIP.AUTO: NEGATIVE
NRBC # BLD: 0 K/UL (ref 0–0.01)
NRBC BLD-RTO: 0 PER 100 WBC
PH UR STRIP: 8 (ref 5–8)
PLATELET # BLD AUTO: 248 K/UL (ref 135–420)
PMV BLD AUTO: 10.9 FL (ref 9.2–11.8)
POTASSIUM SERPL-SCNC: 3.9 MMOL/L (ref 3.5–5.5)
PROT SERPL-MCNC: 7.8 G/DL (ref 6.4–8.2)
PROT UR STRIP-MCNC: NEGATIVE MG/DL
PROTHROMBIN TIME: 12.8 SEC (ref 11.9–14.7)
RBC # BLD AUTO: 4.42 M/UL (ref 4.2–5.3)
SODIUM SERPL-SCNC: 139 MMOL/L (ref 136–145)
SP GR UR REFRACTOMETRY: 1.02 (ref 1–1.03)
UROBILINOGEN UR QL STRIP.AUTO: 0.2 EU/DL (ref 0.2–1)
WBC # BLD AUTO: 6 K/UL (ref 4.6–13.2)

## 2024-05-23 PROCEDURE — 85730 THROMBOPLASTIN TIME PARTIAL: CPT

## 2024-05-23 PROCEDURE — 80053 COMPREHEN METABOLIC PANEL: CPT

## 2024-05-23 PROCEDURE — 36415 COLL VENOUS BLD VENIPUNCTURE: CPT

## 2024-05-23 PROCEDURE — 81003 URINALYSIS AUTO W/O SCOPE: CPT

## 2024-05-23 PROCEDURE — 85610 PROTHROMBIN TIME: CPT

## 2024-05-23 PROCEDURE — 1123F ACP DISCUSS/DSCN MKR DOCD: CPT | Performed by: STUDENT IN AN ORGANIZED HEALTH CARE EDUCATION/TRAINING PROGRAM

## 2024-05-23 PROCEDURE — 99213 OFFICE O/P EST LOW 20 MIN: CPT | Performed by: STUDENT IN AN ORGANIZED HEALTH CARE EDUCATION/TRAINING PROGRAM

## 2024-05-23 PROCEDURE — 85025 COMPLETE CBC W/AUTO DIFF WBC: CPT

## 2024-05-23 NOTE — PROGRESS NOTES
Chief Complaint:  Consult for pre-operative evaluation.    History of Present Illness:  Ms. Hart is a 69 y.o. female with past medical history significant for   Past Medical History:   Diagnosis Date    External hemorrhoids     Helicobacter pylori gastritis     Hepatitis C infection     Peanut allergy    . Presents today for the pre-operative evaluation.    Procedure to be performed: DAVINCI LAPAROSCOPIC HYSTERECTOMY TOTAL; SALPINGECTOMY BILATERAL,  COLPOPEXY,  SLING OPERATION, WITH CYSTOURETHROSCOPY, FOR FEMALE STRESS INCONTINENCE   Procedure to be performed by: Dr. Pringle  Procedure to be performed at: [facility]  Procedure to be performed on: 06/26/2024    Has had anesthesia in the past without any significant issues.     Pt states regarding her abdominal bloating and belching 2/2 h. Pylori it has been resolved.    No Known Allergies  Past Medical History:   Diagnosis Date    External hemorrhoids     Helicobacter pylori gastritis     Hepatitis C infection     Peanut allergy      Past Surgical History:   Procedure Laterality Date    BACK SURGERY  07/2006    Lumbar disc    HEENT  1987    Widened nasal passages    REFRACTIVE SURGERY Bilateral 2000     Family History   Problem Relation Age of Onset    Pancreatic Cancer Father      Social History     Socioeconomic History    Marital status:      Spouse name: Not on file    Number of children: Not on file    Years of education: Not on file    Highest education level: Not on file   Occupational History    Not on file   Tobacco Use    Smoking status: Never    Smokeless tobacco: Never   Substance and Sexual Activity    Alcohol use: Not Currently    Drug use: Never    Sexual activity: Not on file   Other Topics Concern    Not on file   Social History Narrative    Not on file     Social Determinants of Health     Financial Resource Strain: Low Risk  (2/15/2024)    Overall Financial Resource Strain (CARDIA)     Difficulty of Paying

## 2024-05-23 NOTE — PROGRESS NOTES
Fiona Hart is a 69 y.o. year old female who presents today for   Chief Complaint   Patient presents with    Discuss Medications       Is someone accompanying this pt? Yes     Is the patient using any DME equipment during OV? No     Depression Screenin/15/2024     2:22 PM 2/15/2024     2:20 PM 2021     9:59 AM 2021     2:13 PM   PHQ-9 Questionaire   Little interest or pleasure in doing things 0 0 0 0   Feeling down, depressed, or hopeless 0 0 0 0   PHQ-9 Total Score 0 0 0 0       Abuse Screenin/15/2024     2:00 PM   AMB Abuse Screening   Do you ever feel afraid of your partner? N   Are you in a relationship with someone who physically or mentally threatens you? N   Is it safe for you to go home? Y       Learning Assessment:  No question data found.    Fall Risk:      2/15/2024     2:20 PM   Fall Risk   2 or more falls in past year? no   Fall with injury in past year? no           Coordination of Care:   1. \"Have you been to the ER, urgent care clinic since your last visit?  Hospitalized since your last visit?\" No     2. \"Have you seen or consulted any other health care providers outside of the [facility] since your last visit?\" No     3. For patients aged 45-75: Has the patient had a colonoscopy / FIT/ Cologuard? Due     If the patient is female:    4. For patients aged 40-74: Has the patient had a mammogram within the past 2 years? Not due     5. For patients aged 21-65: Has the patient had a pap smear? Not due     Health Maintenance: reviewed and discussed and ordered per Provider.    Health Maintenance Due   Topic Date Due    Colorectal Cancer Screen  Never done    Shingles vaccine (1 of 2) Never done    Respiratory Syncytial Virus (RSV) Pregnant or age 60 yrs+ (1 - 1-dose 60+ series) Never done    Pneumococcal 65+ years Vaccine (2 of 2 - PCV) 2020    COVID-19 Vaccine ( season) 2023        -Maria Elena Hicks LPN  [facility]

## 2024-05-24 ASSESSMENT — ENCOUNTER SYMPTOMS
EYE DISCHARGE: 0
BACK PAIN: 0
CONSTIPATION: 0
COLOR CHANGE: 0
ABDOMINAL PAIN: 0
FACIAL SWELLING: 0
SHORTNESS OF BREATH: 0
EYE ITCHING: 0
EYE PAIN: 0
VOMITING: 0
EYE REDNESS: 0
DIARRHEA: 0

## 2024-08-20 ENCOUNTER — OFFICE VISIT (OUTPATIENT)
Facility: CLINIC | Age: 69
End: 2024-08-20
Payer: COMMERCIAL

## 2024-08-20 VITALS
SYSTOLIC BLOOD PRESSURE: 127 MMHG | OXYGEN SATURATION: 99 % | WEIGHT: 127.4 LBS | RESPIRATION RATE: 14 BRPM | TEMPERATURE: 98.2 F | DIASTOLIC BLOOD PRESSURE: 75 MMHG | HEIGHT: 57 IN | HEART RATE: 70 BPM | BODY MASS INDEX: 27.49 KG/M2

## 2024-08-20 DIAGNOSIS — I83.813 VARICOSE VEINS OF BOTH LOWER EXTREMITIES WITH PAIN: Primary | ICD-10-CM

## 2024-08-20 DIAGNOSIS — R42 DIZZINESS: ICD-10-CM

## 2024-08-20 DIAGNOSIS — E55.9 VITAMIN D DEFICIENCY: ICD-10-CM

## 2024-08-20 PROCEDURE — 1123F ACP DISCUSS/DSCN MKR DOCD: CPT | Performed by: STUDENT IN AN ORGANIZED HEALTH CARE EDUCATION/TRAINING PROGRAM

## 2024-08-20 PROCEDURE — 99214 OFFICE O/P EST MOD 30 MIN: CPT | Performed by: STUDENT IN AN ORGANIZED HEALTH CARE EDUCATION/TRAINING PROGRAM

## 2024-08-20 ASSESSMENT — ENCOUNTER SYMPTOMS
BACK PAIN: 0
EYE PAIN: 0
FACIAL SWELLING: 0
CONSTIPATION: 0
EYE DISCHARGE: 0
EYE ITCHING: 0
VOMITING: 0
DIARRHEA: 0
COLOR CHANGE: 0
EYE REDNESS: 0
ABDOMINAL PAIN: 0
SHORTNESS OF BREATH: 0

## 2024-08-20 NOTE — PROGRESS NOTES
Fiona Hart is a 69 y.o. year old female who presents today for   Chief Complaint   Patient presents with    Discuss Medications       Is someone accompanying this pt? Yes    Is the patient using any DME equipment during OV? No    Depression Screenin/15/2024     2:22 PM 2/15/2024     2:20 PM 2021     9:59 AM 2021     2:13 PM   PHQ-9 Questionaire   Little interest or pleasure in doing things 0 0 0 0   Feeling down, depressed, or hopeless 0 0 0 0   PHQ-9 Total Score 0 0 0 0       Abuse Screenin/15/2024     2:00 PM   AMB Abuse Screening   Do you ever feel afraid of your partner? N   Are you in a relationship with someone who physically or mentally threatens you? N   Is it safe for you to go home? Y       Learning Assessment:  No question data found.    Fall Risk:      2/15/2024     2:25 PM 2/15/2024     2:20 PM   Fall Risk   Do you feel unsteady or are you worried about falling?  no no   2 or more falls in past year?  no   Fall with injury in past year?  no           Coordination of Care:   1. \"Have you been to the ER, urgent care clinic since your last visit?  Hospitalized since your last visit?\" No    2. \"Have you seen or consulted any other health care providers outside of the [facility] since your last visit?\" Yes    3. For patients aged 45-75: Has the patient had a colonoscopy / FIT/ Cologuard? Due    If the patient is female:    4. For patients aged 40-74: Has the patient had a mammogram within the past 2 years? Not Due     5. For patients aged 21-65: Has the patient had a pap smear? N/A    Health Maintenance: reviewed and discussed and ordered per Provider.    Health Maintenance Due   Topic Date Due    Diabetes screen  Never done    Colorectal Cancer Screen  Never done    Shingles vaccine (1 of 2) Never done    Respiratory Syncytial Virus (RSV) Pregnant or age 60 yrs+ (1 - 1-dose 60+ series) Never done    Pneumococcal 65+ years Vaccine (2 of 2 - PCV) 2020    
(Non-Medical): No   Physical Activity: Not on file   Stress: Not on file   Social Connections: Not on file   Intimate Partner Violence: Not on file   Housing Stability: Unknown (2/15/2024)    Housing Stability Vital Sign     Unable to Pay for Housing in the Last Year: Not on file     Number of Places Lived in the Last Year: Not on file     Unstable Housing in the Last Year: No        Current Outpatient Medications   Medication Sig Dispense Refill    pantoprazole (PROTONIX) 40 MG tablet Take 1 tablet by mouth in the morning and at bedtime for 14 days (Patient not taking: Reported on 8/20/2024) 28 tablet 0     No current facility-administered medications for this visit.        ROS   Review of Systems   Constitutional:  Negative for activity change and appetite change.   HENT:  Negative for congestion, drooling, ear discharge, ear pain and facial swelling.    Eyes:  Negative for pain, discharge, redness and itching.   Respiratory:  Negative for shortness of breath.    Cardiovascular:  Negative for leg swelling.   Gastrointestinal:  Negative for abdominal pain, constipation, diarrhea and vomiting.   Genitourinary:  Negative for difficulty urinating, frequency, hematuria and urgency.   Musculoskeletal:  Negative for arthralgias, back pain, myalgias and neck pain.   Skin:  Negative for color change.   Neurological:  Positive for dizziness. Negative for seizures, numbness and headaches.   Psychiatric/Behavioral:  Negative for agitation, behavioral problems, decreased concentration, sleep disturbance and suicidal ideas.              Objective:  Vitals:    08/20/24 0847   BP: 127/75   Site: Left Upper Arm   Position: Sitting   Cuff Size: Small Adult   Pulse: 70   Resp: 14   Temp: 98.2 °F (36.8 °C)   TempSrc: Temporal   SpO2: 99%   Weight: 57.8 kg (127 lb 6.4 oz)   Height: 1.448 m (4' 9\")         Physical Exam  Vitals reviewed.   Constitutional:       Appearance: She is normal weight.   HENT:      Head: Normocephalic.

## 2024-09-05 ENCOUNTER — HOSPITAL ENCOUNTER (OUTPATIENT)
Facility: HOSPITAL | Age: 69
Setting detail: SPECIMEN
Discharge: HOME | End: 2024-09-08
Payer: COMMERCIAL

## 2024-09-05 LAB
25(OH)D3 SERPL-MCNC: 15.7 NG/ML (ref 30–100)
ALBUMIN SERPL-MCNC: 4.1 G/DL (ref 3.4–5)
ALBUMIN/GLOB SERPL: 1.2 (ref 0.8–1.7)
ALP SERPL-CCNC: 61 U/L (ref 45–117)
ALT SERPL-CCNC: 18 U/L (ref 13–56)
ANION GAP SERPL CALC-SCNC: 5 MMOL/L (ref 3–18)
APPEARANCE UR: CLEAR
AST SERPL-CCNC: 15 U/L (ref 10–38)
BILIRUB SERPL-MCNC: 0.8 MG/DL (ref 0.2–1)
BILIRUB UR QL: NEGATIVE
BUN SERPL-MCNC: 13 MG/DL (ref 7–18)
BUN/CREAT SERPL: 20 (ref 12–20)
CALCIUM SERPL-MCNC: 8.8 MG/DL (ref 8.5–10.1)
CHLORIDE SERPL-SCNC: 107 MMOL/L (ref 100–111)
CO2 SERPL-SCNC: 27 MMOL/L (ref 21–32)
COLOR UR: YELLOW
CREAT SERPL-MCNC: 0.66 MG/DL (ref 0.6–1.3)
ERYTHROCYTE [DISTWIDTH] IN BLOOD BY AUTOMATED COUNT: 15 % (ref 11.6–14.5)
GLOBULIN SER CALC-MCNC: 3.3 G/DL (ref 2–4)
GLUCOSE SERPL-MCNC: 101 MG/DL (ref 74–99)
GLUCOSE UR STRIP.AUTO-MCNC: NEGATIVE MG/DL
HCT VFR BLD AUTO: 42.4 % (ref 35–45)
HGB BLD-MCNC: 13.1 G/DL (ref 12–16)
HGB UR QL STRIP: NEGATIVE
KETONES UR QL STRIP.AUTO: NEGATIVE MG/DL
LEUKOCYTE ESTERASE UR QL STRIP.AUTO: NEGATIVE
MCH RBC QN AUTO: 29.2 PG (ref 24–34)
MCHC RBC AUTO-ENTMCNC: 30.9 G/DL (ref 31–37)
MCV RBC AUTO: 94.6 FL (ref 78–100)
NITRITE UR QL STRIP.AUTO: NEGATIVE
NRBC # BLD: 0 K/UL (ref 0–0.01)
NRBC BLD-RTO: 0 PER 100 WBC
PH UR STRIP: 6.5 (ref 5–8)
PLATELET # BLD AUTO: 212 K/UL (ref 135–420)
PMV BLD AUTO: 10.3 FL (ref 9.2–11.8)
POTASSIUM SERPL-SCNC: 3.9 MMOL/L (ref 3.5–5.5)
PROT SERPL-MCNC: 7.4 G/DL (ref 6.4–8.2)
PROT UR STRIP-MCNC: NEGATIVE MG/DL
RBC # BLD AUTO: 4.48 M/UL (ref 4.2–5.3)
SODIUM SERPL-SCNC: 139 MMOL/L (ref 136–145)
SP GR UR REFRACTOMETRY: 1.02 (ref 1–1.03)
TSH SERPL DL<=0.05 MIU/L-ACNC: 2.2 UIU/ML (ref 0.36–3.74)
UROBILINOGEN UR QL STRIP.AUTO: 0.2 EU/DL (ref 0.2–1)
VIT B12 SERPL-MCNC: 485 PG/ML (ref 211–911)
WBC # BLD AUTO: 3.9 K/UL (ref 4.6–13.2)

## 2024-09-05 PROCEDURE — 80053 COMPREHEN METABOLIC PANEL: CPT

## 2024-09-05 PROCEDURE — 84443 ASSAY THYROID STIM HORMONE: CPT

## 2024-09-05 PROCEDURE — 82607 VITAMIN B-12: CPT

## 2024-09-05 PROCEDURE — 82306 VITAMIN D 25 HYDROXY: CPT

## 2024-09-05 PROCEDURE — 81003 URINALYSIS AUTO W/O SCOPE: CPT

## 2024-09-05 PROCEDURE — 85027 COMPLETE CBC AUTOMATED: CPT

## 2024-09-05 PROCEDURE — 36415 COLL VENOUS BLD VENIPUNCTURE: CPT

## 2024-09-17 ENCOUNTER — OFFICE VISIT (OUTPATIENT)
Facility: CLINIC | Age: 69
End: 2024-09-17
Payer: MEDICARE

## 2024-09-17 VITALS
WEIGHT: 128.6 LBS | OXYGEN SATURATION: 97 % | RESPIRATION RATE: 14 BRPM | HEIGHT: 57 IN | SYSTOLIC BLOOD PRESSURE: 117 MMHG | BODY MASS INDEX: 27.74 KG/M2 | HEART RATE: 64 BPM | TEMPERATURE: 97.9 F | DIASTOLIC BLOOD PRESSURE: 78 MMHG

## 2024-09-17 DIAGNOSIS — Z00.00 MEDICARE ANNUAL WELLNESS VISIT, SUBSEQUENT: Primary | ICD-10-CM

## 2024-09-17 DIAGNOSIS — E55.9 VITAMIN D DEFICIENCY: ICD-10-CM

## 2024-09-17 PROCEDURE — G8419 CALC BMI OUT NRM PARAM NOF/U: HCPCS | Performed by: STUDENT IN AN ORGANIZED HEALTH CARE EDUCATION/TRAINING PROGRAM

## 2024-09-17 PROCEDURE — 1036F TOBACCO NON-USER: CPT | Performed by: STUDENT IN AN ORGANIZED HEALTH CARE EDUCATION/TRAINING PROGRAM

## 2024-09-17 PROCEDURE — G8427 DOCREV CUR MEDS BY ELIG CLIN: HCPCS | Performed by: STUDENT IN AN ORGANIZED HEALTH CARE EDUCATION/TRAINING PROGRAM

## 2024-09-17 PROCEDURE — 1123F ACP DISCUSS/DSCN MKR DOCD: CPT | Performed by: STUDENT IN AN ORGANIZED HEALTH CARE EDUCATION/TRAINING PROGRAM

## 2024-09-17 PROCEDURE — 99214 OFFICE O/P EST MOD 30 MIN: CPT | Performed by: STUDENT IN AN ORGANIZED HEALTH CARE EDUCATION/TRAINING PROGRAM

## 2024-09-17 PROCEDURE — 3017F COLORECTAL CA SCREEN DOC REV: CPT | Performed by: STUDENT IN AN ORGANIZED HEALTH CARE EDUCATION/TRAINING PROGRAM

## 2024-09-17 PROCEDURE — G0439 PPPS, SUBSEQ VISIT: HCPCS | Performed by: STUDENT IN AN ORGANIZED HEALTH CARE EDUCATION/TRAINING PROGRAM

## 2024-09-17 PROCEDURE — G8399 PT W/DXA RESULTS DOCUMENT: HCPCS | Performed by: STUDENT IN AN ORGANIZED HEALTH CARE EDUCATION/TRAINING PROGRAM

## 2024-09-17 PROCEDURE — 1090F PRES/ABSN URINE INCON ASSESS: CPT | Performed by: STUDENT IN AN ORGANIZED HEALTH CARE EDUCATION/TRAINING PROGRAM

## 2024-09-17 RX ORDER — ERGOCALCIFEROL 1.25 MG/1
50000 CAPSULE, LIQUID FILLED ORAL WEEKLY
Qty: 12 CAPSULE | Refills: 1 | Status: SHIPPED | OUTPATIENT
Start: 2024-09-17 | End: 2024-09-17

## 2024-09-17 RX ORDER — ERGOCALCIFEROL 1.25 MG/1
50000 CAPSULE, LIQUID FILLED ORAL WEEKLY
Qty: 12 CAPSULE | Refills: 1 | Status: SHIPPED | OUTPATIENT
Start: 2024-09-17

## 2024-09-17 ASSESSMENT — PATIENT HEALTH QUESTIONNAIRE - PHQ9
1. LITTLE INTEREST OR PLEASURE IN DOING THINGS: NOT AT ALL
SUM OF ALL RESPONSES TO PHQ QUESTIONS 1-9: 0
SUM OF ALL RESPONSES TO PHQ9 QUESTIONS 1 & 2: 0
SUM OF ALL RESPONSES TO PHQ QUESTIONS 1-9: 0
SUM OF ALL RESPONSES TO PHQ QUESTIONS 1-9: 0
2. FEELING DOWN, DEPRESSED OR HOPELESS: NOT AT ALL
SUM OF ALL RESPONSES TO PHQ QUESTIONS 1-9: 0

## 2024-09-17 ASSESSMENT — ENCOUNTER SYMPTOMS
VOMITING: 0
DIARRHEA: 0
EYE REDNESS: 0
SHORTNESS OF BREATH: 0
EYE DISCHARGE: 0
COLOR CHANGE: 0
FACIAL SWELLING: 0
EYE PAIN: 0
BACK PAIN: 0
CONSTIPATION: 0
EYE ITCHING: 0
ABDOMINAL PAIN: 0

## 2024-09-17 ASSESSMENT — VISUAL ACUITY
OD_CC: 20/20
OS_CC: 20/50

## 2024-09-17 ASSESSMENT — LIFESTYLE VARIABLES
HOW OFTEN DO YOU HAVE A DRINK CONTAINING ALCOHOL: NEVER
HOW MANY STANDARD DRINKS CONTAINING ALCOHOL DO YOU HAVE ON A TYPICAL DAY: PATIENT DOES NOT DRINK

## 2024-11-21 ENCOUNTER — OFFICE VISIT (OUTPATIENT)
Facility: CLINIC | Age: 69
End: 2024-11-21
Payer: MEDICARE

## 2024-11-21 VITALS
TEMPERATURE: 97.6 F | BODY MASS INDEX: 28.09 KG/M2 | SYSTOLIC BLOOD PRESSURE: 120 MMHG | OXYGEN SATURATION: 97 % | HEART RATE: 63 BPM | WEIGHT: 130.2 LBS | RESPIRATION RATE: 14 BRPM | DIASTOLIC BLOOD PRESSURE: 74 MMHG | HEIGHT: 57 IN

## 2024-11-21 DIAGNOSIS — M79.18 MYALGIA, OTHER SITE: ICD-10-CM

## 2024-11-21 DIAGNOSIS — M54.2 MYOFASCIAL NECK PAIN: Primary | ICD-10-CM

## 2024-11-21 PROCEDURE — 1036F TOBACCO NON-USER: CPT | Performed by: STUDENT IN AN ORGANIZED HEALTH CARE EDUCATION/TRAINING PROGRAM

## 2024-11-21 PROCEDURE — 96372 THER/PROPH/DIAG INJ SC/IM: CPT | Performed by: STUDENT IN AN ORGANIZED HEALTH CARE EDUCATION/TRAINING PROGRAM

## 2024-11-21 PROCEDURE — 3017F COLORECTAL CA SCREEN DOC REV: CPT | Performed by: STUDENT IN AN ORGANIZED HEALTH CARE EDUCATION/TRAINING PROGRAM

## 2024-11-21 PROCEDURE — G8427 DOCREV CUR MEDS BY ELIG CLIN: HCPCS | Performed by: STUDENT IN AN ORGANIZED HEALTH CARE EDUCATION/TRAINING PROGRAM

## 2024-11-21 PROCEDURE — G8484 FLU IMMUNIZE NO ADMIN: HCPCS | Performed by: STUDENT IN AN ORGANIZED HEALTH CARE EDUCATION/TRAINING PROGRAM

## 2024-11-21 PROCEDURE — 1090F PRES/ABSN URINE INCON ASSESS: CPT | Performed by: STUDENT IN AN ORGANIZED HEALTH CARE EDUCATION/TRAINING PROGRAM

## 2024-11-21 PROCEDURE — 1123F ACP DISCUSS/DSCN MKR DOCD: CPT | Performed by: STUDENT IN AN ORGANIZED HEALTH CARE EDUCATION/TRAINING PROGRAM

## 2024-11-21 PROCEDURE — G8399 PT W/DXA RESULTS DOCUMENT: HCPCS | Performed by: STUDENT IN AN ORGANIZED HEALTH CARE EDUCATION/TRAINING PROGRAM

## 2024-11-21 PROCEDURE — 99214 OFFICE O/P EST MOD 30 MIN: CPT | Performed by: STUDENT IN AN ORGANIZED HEALTH CARE EDUCATION/TRAINING PROGRAM

## 2024-11-21 PROCEDURE — G8419 CALC BMI OUT NRM PARAM NOF/U: HCPCS | Performed by: STUDENT IN AN ORGANIZED HEALTH CARE EDUCATION/TRAINING PROGRAM

## 2024-11-21 RX ORDER — KETOROLAC TROMETHAMINE 30 MG/ML
30 INJECTION, SOLUTION INTRAMUSCULAR; INTRAVENOUS ONCE
Status: COMPLETED | OUTPATIENT
Start: 2024-11-21 | End: 2024-11-21

## 2024-11-21 RX ORDER — TIZANIDINE 2 MG/1
2 TABLET ORAL 3 TIMES DAILY PRN
Qty: 30 TABLET | Refills: 0 | Status: SHIPPED | OUTPATIENT
Start: 2024-11-21

## 2024-11-21 RX ORDER — TIZANIDINE 2 MG/1
2 TABLET ORAL 3 TIMES DAILY PRN
Qty: 30 TABLET | Refills: 0 | Status: SHIPPED | OUTPATIENT
Start: 2024-11-21 | End: 2024-11-21 | Stop reason: SDUPTHER

## 2024-11-21 RX ADMIN — KETOROLAC TROMETHAMINE 30 MG: 30 INJECTION, SOLUTION INTRAMUSCULAR; INTRAVENOUS at 14:52

## 2024-11-21 ASSESSMENT — ENCOUNTER SYMPTOMS
FACIAL SWELLING: 0
EYE ITCHING: 0
EYE REDNESS: 0
EYE PAIN: 0
CONSTIPATION: 0
DIARRHEA: 0
ABDOMINAL PAIN: 0
SHORTNESS OF BREATH: 0
EYE DISCHARGE: 0
VOMITING: 0
BACK PAIN: 0
COLOR CHANGE: 0

## 2024-11-21 NOTE — PROGRESS NOTES
Fiona Hart is a 69 y.o. year old female who presents today for   Chief Complaint   Patient presents with    Neck Pain        \"Have you been to the ER, urgent care clinic since your last visit?  Hospitalized since your last visit?\"   NO     “Have you seen or consulted any other health care providers outside our system since your last visit?”   NO       “Have you had a colorectal cancer screening such as a colonoscopy/FIT/Cologuard?    NO    No colonoscopy on file  No cologuard on file  No FIT/FOBT on file   No flexible sigmoidoscopy on file           - LEYLA Dennison  [facility]  Phone: [phone]  Fax: [phone]  
patient and the intended plan as seen in the above orders.  The patient has received an after-visit summary and questions were answered concerning future plans.  I have discussed medication side effects and warnings with the patient as well. I have reviewed the plan of care with the patient, accepted their input and they are in agreement with the treatment goals.     Isa Conley MD

## 2024-12-12 ENCOUNTER — COMMUNITY OUTREACH (OUTPATIENT)
Facility: CLINIC | Age: 69
End: 2024-12-12

## 2024-12-12 ENCOUNTER — OFFICE VISIT (OUTPATIENT)
Facility: CLINIC | Age: 69
End: 2024-12-12
Payer: MEDICARE

## 2024-12-12 DIAGNOSIS — Z23 FLU VACCINE NEED: Primary | ICD-10-CM

## 2024-12-12 PROCEDURE — 90653 IIV ADJUVANT VACCINE IM: CPT | Performed by: STUDENT IN AN ORGANIZED HEALTH CARE EDUCATION/TRAINING PROGRAM

## 2024-12-12 PROCEDURE — G0008 ADMIN INFLUENZA VIRUS VAC: HCPCS | Performed by: STUDENT IN AN ORGANIZED HEALTH CARE EDUCATION/TRAINING PROGRAM

## 2024-12-12 NOTE — PROGRESS NOTES
Patient's HM shows they are overdue for Colorectal Screening.   Care Everywhere and  files searched.  No results to attach to order nor HM updated.     [facility]

## 2024-12-12 NOTE — PROGRESS NOTES
After obtaining verbal consent from DR. MARVIN, patient/guardian signed immunization consent form to receive the FLU  vaccine. Most current VIS given to the patient/guardian to review before administration. All questions were answered. Patient tolerated immunization(s) well with no adverse reactions.

## 2025-03-20 ENCOUNTER — OFFICE VISIT (OUTPATIENT)
Facility: CLINIC | Age: 70
End: 2025-03-20

## 2025-03-20 VITALS
TEMPERATURE: 98.1 F | DIASTOLIC BLOOD PRESSURE: 75 MMHG | OXYGEN SATURATION: 97 % | RESPIRATION RATE: 14 BRPM | BODY MASS INDEX: 28.26 KG/M2 | WEIGHT: 131 LBS | HEART RATE: 85 BPM | SYSTOLIC BLOOD PRESSURE: 115 MMHG | HEIGHT: 57 IN

## 2025-03-20 DIAGNOSIS — E55.9 VITAMIN D DEFICIENCY: ICD-10-CM

## 2025-03-20 DIAGNOSIS — E78.49 OTHER HYPERLIPIDEMIA: ICD-10-CM

## 2025-03-20 DIAGNOSIS — I83.813 VARICOSE VEINS OF BOTH LOWER EXTREMITIES WITH PAIN: ICD-10-CM

## 2025-03-20 DIAGNOSIS — Z00.00 MEDICARE ANNUAL WELLNESS VISIT, SUBSEQUENT: Primary | ICD-10-CM

## 2025-03-20 SDOH — ECONOMIC STABILITY: FOOD INSECURITY: WITHIN THE PAST 12 MONTHS, YOU WORRIED THAT YOUR FOOD WOULD RUN OUT BEFORE YOU GOT MONEY TO BUY MORE.: NEVER TRUE

## 2025-03-20 SDOH — ECONOMIC STABILITY: FOOD INSECURITY: WITHIN THE PAST 12 MONTHS, THE FOOD YOU BOUGHT JUST DIDN'T LAST AND YOU DIDN'T HAVE MONEY TO GET MORE.: NEVER TRUE

## 2025-03-20 ASSESSMENT — PATIENT HEALTH QUESTIONNAIRE - PHQ9
SUM OF ALL RESPONSES TO PHQ QUESTIONS 1-9: 0
1. LITTLE INTEREST OR PLEASURE IN DOING THINGS: NOT AT ALL
2. FEELING DOWN, DEPRESSED OR HOPELESS: NOT AT ALL
SUM OF ALL RESPONSES TO PHQ QUESTIONS 1-9: 0

## 2025-03-20 ASSESSMENT — VISUAL ACUITY
OD_CC: 20/20
OS_CC: 20/50

## 2025-03-20 NOTE — PROGRESS NOTES
Fiona Hart is a 70 y.o. year old female who presents today for   Chief Complaint   Patient presents with    Medicare AWV    6 Month Follow-Up        \"Have you been to the ER, urgent care clinic since your last visit?  Hospitalized since your last visit?\"   NO     “Have you seen or consulted any other health care providers outside our system since your last visit?”   NO             - LEYLA Dennison  [facility]  Phone: [phone]  Fax: [phone]  
Thought content normal.         Judgment: Judgment normal.           LABS     TESTS      Assessment/Plan:    1. Medicare annual wellness visit, subsequent  See above    2. Varicose veins of both lower extremities with pain  - External Referral To Vascular Surgery    3. Vitamin D deficiency  - Vitamin D 25 Hydroxy; Future    4. Other hyperlipidemia  - TSH; Future  - CBC; Future  - Comprehensive Metabolic Panel; Future  - Urinalysis; Future  - Lipid Panel; Future      Lab review: orders written for new lab studies as appropriate; see orders    On this date 3/20/2025 I have spent 30 minutes reviewing previous notes, test results and face to face with the patient discussing the diagnosis and importance of compliance with the treatment plan as well as documenting on the day of the visit.    I have discussed the diagnosis with the patient and the intended plan as seen in the above orders.  The patient has received an after-visit summary and questions were answered concerning future plans.  I have discussed medication side effects and warnings with the patient as well. I have reviewed the plan of care with the patient, accepted their input and they are in agreement with the treatment goals.     Isa Conley MD

## 2025-03-20 NOTE — PATIENT INSTRUCTIONS
[employer], [facility], disclaims any warranty or liability for your use of this information.    Personalized Preventive Plan for Fiona Hart - 3/20/2025  Medicare offers a range of preventive health benefits. Some of the tests and screenings are paid in full while other may be subject to a deductible, co-insurance, and/or copay.  Some of these benefits include a comprehensive review of your medical history including lifestyle, illnesses that may run in your family, and various assessments and screenings as appropriate.  After reviewing your medical record and screening and assessments performed today your provider may have ordered immunizations, labs, imaging, and/or referrals for you.  A list of these orders (if applicable) as well as your Preventive Care list are included within your After Visit Summary for your review.

## 2025-03-21 ASSESSMENT — ENCOUNTER SYMPTOMS
COLOR CHANGE: 0
EYE ITCHING: 0
EYE PAIN: 0
FACIAL SWELLING: 0
VOMITING: 0
CONSTIPATION: 0
EYE DISCHARGE: 0
EYE REDNESS: 0
ABDOMINAL PAIN: 0
BACK PAIN: 0
SHORTNESS OF BREATH: 0
DIARRHEA: 0

## 2025-05-02 LAB
25(OH)D3+25(OH)D2 SERPL-MCNC: 49 NG/ML (ref 30–100)
ALBUMIN SERPL-MCNC: 4.6 G/DL (ref 3.9–4.9)
ALP SERPL-CCNC: 56 IU/L (ref 44–121)
ALT SERPL-CCNC: 11 IU/L (ref 0–32)
APPEARANCE UR: CLEAR
AST SERPL-CCNC: 16 IU/L (ref 0–40)
BASOPHILS # BLD AUTO: 0.1 X10E3/UL (ref 0–0.2)
BASOPHILS NFR BLD AUTO: 1 %
BILIRUB SERPL-MCNC: 0.5 MG/DL (ref 0–1.2)
BILIRUB UR QL STRIP: NEGATIVE
BUN SERPL-MCNC: 13 MG/DL (ref 8–27)
BUN/CREAT SERPL: 19 (ref 12–28)
CALCIUM SERPL-MCNC: 9.2 MG/DL (ref 8.7–10.3)
CHLORIDE SERPL-SCNC: 102 MMOL/L (ref 96–106)
CHOLEST SERPL-MCNC: 207 MG/DL (ref 100–199)
CO2 SERPL-SCNC: 24 MMOL/L (ref 20–29)
COLOR UR: YELLOW
CREAT SERPL-MCNC: 0.67 MG/DL (ref 0.57–1)
EGFRCR SERPLBLD CKD-EPI 2021: 94 ML/MIN/1.73
EOSINOPHIL # BLD AUTO: 0.1 X10E3/UL (ref 0–0.4)
EOSINOPHIL NFR BLD AUTO: 2 %
ERYTHROCYTE [DISTWIDTH] IN BLOOD BY AUTOMATED COUNT: 13.7 % (ref 11.7–15.4)
GLOBULIN SER CALC-MCNC: 2.7 G/DL (ref 1.5–4.5)
GLUCOSE SERPL-MCNC: 77 MG/DL (ref 70–99)
GLUCOSE UR QL STRIP: NEGATIVE
HCT VFR BLD AUTO: 39.7 % (ref 34–46.6)
HDLC SERPL-MCNC: 61 MG/DL
HGB BLD-MCNC: 12.8 G/DL (ref 11.1–15.9)
HGB UR QL STRIP: NEGATIVE
IMM GRANULOCYTES # BLD AUTO: 0 X10E3/UL (ref 0–0.1)
IMM GRANULOCYTES NFR BLD AUTO: 0 %
KETONES UR QL STRIP: NEGATIVE
LDLC SERPL CALC-MCNC: 131 MG/DL (ref 0–99)
LEUKOCYTE ESTERASE UR QL STRIP: NEGATIVE
LYMPHOCYTES # BLD AUTO: 1.2 X10E3/UL (ref 0.7–3.1)
LYMPHOCYTES NFR BLD AUTO: 26 %
MCH RBC QN AUTO: 30 PG (ref 26.6–33)
MCHC RBC AUTO-ENTMCNC: 32.2 G/DL (ref 31.5–35.7)
MCV RBC AUTO: 93 FL (ref 79–97)
MICRO URNS: NORMAL
MONOCYTES # BLD AUTO: 0.4 X10E3/UL (ref 0.1–0.9)
MONOCYTES NFR BLD AUTO: 9 %
NEUTROPHILS # BLD AUTO: 2.9 X10E3/UL (ref 1.4–7)
NEUTROPHILS NFR BLD AUTO: 62 %
NITRITE UR QL STRIP: NEGATIVE
PH UR STRIP: 6 [PH] (ref 5–7.5)
PLATELET # BLD AUTO: 197 X10E3/UL (ref 150–450)
POTASSIUM SERPL-SCNC: 4.1 MMOL/L (ref 3.5–5.2)
PROT SERPL-MCNC: 7.3 G/DL (ref 6–8.5)
PROT UR QL STRIP: NEGATIVE
RBC # BLD AUTO: 4.27 X10E6/UL (ref 3.77–5.28)
SODIUM SERPL-SCNC: 139 MMOL/L (ref 134–144)
SP GR UR STRIP: 1.01 (ref 1–1.03)
TRIGL SERPL-MCNC: 86 MG/DL (ref 0–149)
TSH SERPL DL<=0.005 MIU/L-ACNC: 2.89 UIU/ML (ref 0.45–4.5)
UROBILINOGEN UR STRIP-MCNC: 0.2 MG/DL (ref 0.2–1)
VLDLC SERPL CALC-MCNC: 15 MG/DL (ref 5–40)
WBC # BLD AUTO: 4.6 X10E3/UL (ref 3.4–10.8)

## 2025-05-20 ENCOUNTER — OFFICE VISIT (OUTPATIENT)
Facility: CLINIC | Age: 70
End: 2025-05-20
Payer: MEDICARE

## 2025-05-20 VITALS
TEMPERATURE: 98.1 F | BODY MASS INDEX: 28.09 KG/M2 | DIASTOLIC BLOOD PRESSURE: 66 MMHG | HEART RATE: 69 BPM | OXYGEN SATURATION: 98 % | SYSTOLIC BLOOD PRESSURE: 115 MMHG | HEIGHT: 57 IN | WEIGHT: 130.2 LBS | RESPIRATION RATE: 14 BRPM

## 2025-05-20 DIAGNOSIS — Z00.00 ENCOUNTER FOR ROUTINE ADULT MEDICAL EXAMINATION: Primary | ICD-10-CM

## 2025-05-20 DIAGNOSIS — E55.9 VITAMIN D DEFICIENCY: ICD-10-CM

## 2025-05-20 PROCEDURE — 1159F MED LIST DOCD IN RCRD: CPT | Performed by: STUDENT IN AN ORGANIZED HEALTH CARE EDUCATION/TRAINING PROGRAM

## 2025-05-20 PROCEDURE — 99397 PER PM REEVAL EST PAT 65+ YR: CPT | Performed by: STUDENT IN AN ORGANIZED HEALTH CARE EDUCATION/TRAINING PROGRAM

## 2025-05-20 NOTE — PROGRESS NOTES
Fiona Hart is a 70 y.o.  female and presents with    Chief Complaint   Patient presents with    Annual Exam     7 week follow up      Discuss Labs           Subjective:    Health Maintenance  Colon cancer: done 2023  Dyslipidemia: done 05/01/2025  Diabetes mellitus:   Influenza vaccine: due in the fall  Pneumococcal vaccine:   Tdap:   Herpes Zoster vaccine: due at age 50  Hep B vaccine: not indicated    Weight: Body mass index is Estimated body mass index is Body mass index is 28.18 kg/m².. Discussed the patient's BMI with her.  The BMI follow up plan is as follows: Improve diet and 30 min of moderate activity at least 5 times a week.    Will have appt June 2nd in vascular - they are waiting to see if the veins can be treated.     There is no problem list on file for this patient.     Past Medical History:   Diagnosis Date    External hemorrhoids     Helicobacter pylori gastritis     Hepatitis C infection     Peanut allergy       Past Surgical History:   Procedure Laterality Date    BACK SURGERY  07/2006    Lumbar disc    HEENT  1987    Widened nasal passages    REFRACTIVE SURGERY Bilateral 2000      Family History   Problem Relation Age of Onset    Pancreatic Cancer Father      Social History     Socioeconomic History    Marital status:      Spouse name: Not on file    Number of children: Not on file    Years of education: Not on file    Highest education level: Not on file   Occupational History    Not on file   Tobacco Use    Smoking status: Never    Smokeless tobacco: Never   Substance and Sexual Activity    Alcohol use: Not Currently    Drug use: Never    Sexual activity: Not Currently   Other Topics Concern    Not on file   Social History Narrative    Not on file     Social Drivers of Health     Financial Resource Strain: Low Risk  (2/15/2024)    Overall Financial Resource Strain (CARDIA)     Difficulty of Paying Living Expenses: Not hard at all   Food Insecurity: No Food Insecurity

## 2025-05-20 NOTE — PROGRESS NOTES
Fiona Hart is a 70 y.o. year old female who presents today for   Chief Complaint   Patient presents with    Annual Exam     7 week follow up          \"Have you been to the ER, urgent care clinic since your last visit?  Hospitalized since your last visit?\"   NO     “Have you seen or consulted any other health care providers outside our system since your last visit?”   NO     Have you had a mammogram?”   NO    Date of last Mammogram: 4/27/2023             - LEYLA Dennison  [facility]  Phone: [phone]  Fax: [phone]

## 2025-07-08 DIAGNOSIS — M54.2 MYOFASCIAL NECK PAIN: ICD-10-CM

## 2025-07-08 NOTE — TELEPHONE ENCOUNTER
Medication(s) requesting:   Requested Prescriptions     Pending Prescriptions Disp Refills    tiZANidine (ZANAFLEX) 2 MG tablet 30 tablet 0     Sig: Take 1 tablet by mouth 3 times daily as needed (muscle spasm)       Last office visit:  05/25/2025  Next office visit DMA: Visit date not found

## 2025-07-09 RX ORDER — TIZANIDINE 2 MG/1
2 TABLET ORAL 3 TIMES DAILY PRN
Qty: 30 TABLET | Refills: 0 | Status: SHIPPED | OUTPATIENT
Start: 2025-07-09